# Patient Record
Sex: MALE | Race: WHITE | NOT HISPANIC OR LATINO | URBAN - METROPOLITAN AREA
[De-identification: names, ages, dates, MRNs, and addresses within clinical notes are randomized per-mention and may not be internally consistent; named-entity substitution may affect disease eponyms.]

---

## 2018-02-08 ENCOUNTER — EMERGENCY (EMERGENCY)
Facility: HOSPITAL | Age: 58
LOS: 1 days | Discharge: HOME | End: 2018-02-08
Attending: EMERGENCY MEDICINE | Admitting: EMERGENCY MEDICINE
Payer: COMMERCIAL

## 2018-02-08 VITALS
SYSTOLIC BLOOD PRESSURE: 138 MMHG | TEMPERATURE: 99 F | OXYGEN SATURATION: 96 % | RESPIRATION RATE: 16 BRPM | HEART RATE: 81 BPM | DIASTOLIC BLOOD PRESSURE: 77 MMHG

## 2018-02-08 LAB
ALBUMIN SERPL ELPH-MCNC: 3.8 G/DL — SIGNIFICANT CHANGE UP (ref 3.3–5)
ALP SERPL-CCNC: 78 U/L — SIGNIFICANT CHANGE UP (ref 40–120)
ALT FLD-CCNC: 35 U/L RC — SIGNIFICANT CHANGE UP (ref 10–45)
ANION GAP SERPL CALC-SCNC: 13 MMOL/L — SIGNIFICANT CHANGE UP (ref 5–17)
AST SERPL-CCNC: 35 U/L — SIGNIFICANT CHANGE UP (ref 10–40)
BASOPHILS # BLD AUTO: 0 K/UL — SIGNIFICANT CHANGE UP (ref 0–0.2)
BASOPHILS NFR BLD AUTO: 0.5 % — SIGNIFICANT CHANGE UP (ref 0–2)
BILIRUB SERPL-MCNC: 0.2 MG/DL — SIGNIFICANT CHANGE UP (ref 0.2–1.2)
BUN SERPL-MCNC: 20 MG/DL — SIGNIFICANT CHANGE UP (ref 7–23)
CALCIUM SERPL-MCNC: 9.2 MG/DL — SIGNIFICANT CHANGE UP (ref 8.4–10.5)
CHLORIDE SERPL-SCNC: 101 MMOL/L — SIGNIFICANT CHANGE UP (ref 96–108)
CO2 SERPL-SCNC: 28 MMOL/L — SIGNIFICANT CHANGE UP (ref 22–31)
CREAT SERPL-MCNC: 1.23 MG/DL — SIGNIFICANT CHANGE UP (ref 0.5–1.3)
EOSINOPHIL # BLD AUTO: 0 K/UL — SIGNIFICANT CHANGE UP (ref 0–0.5)
EOSINOPHIL NFR BLD AUTO: 0.5 % — SIGNIFICANT CHANGE UP (ref 0–6)
GLUCOSE SERPL-MCNC: 115 MG/DL — HIGH (ref 70–99)
HCT VFR BLD CALC: 45.9 % — SIGNIFICANT CHANGE UP (ref 39–50)
HGB BLD-MCNC: 15.6 G/DL — SIGNIFICANT CHANGE UP (ref 13–17)
LYMPHOCYTES # BLD AUTO: 1.6 K/UL — SIGNIFICANT CHANGE UP (ref 1–3.3)
LYMPHOCYTES # BLD AUTO: 28.5 % — SIGNIFICANT CHANGE UP (ref 13–44)
MCHC RBC-ENTMCNC: 31.6 PG — SIGNIFICANT CHANGE UP (ref 27–34)
MCHC RBC-ENTMCNC: 34.1 GM/DL — SIGNIFICANT CHANGE UP (ref 32–36)
MCV RBC AUTO: 92.9 FL — SIGNIFICANT CHANGE UP (ref 80–100)
MONOCYTES # BLD AUTO: 0.7 K/UL — SIGNIFICANT CHANGE UP (ref 0–0.9)
MONOCYTES NFR BLD AUTO: 13.3 % — SIGNIFICANT CHANGE UP (ref 2–14)
NEUTROPHILS # BLD AUTO: 3.2 K/UL — SIGNIFICANT CHANGE UP (ref 1.8–7.4)
NEUTROPHILS NFR BLD AUTO: 57.2 % — SIGNIFICANT CHANGE UP (ref 43–77)
PLATELET # BLD AUTO: 137 K/UL — LOW (ref 150–400)
POTASSIUM SERPL-MCNC: 4 MMOL/L — SIGNIFICANT CHANGE UP (ref 3.5–5.3)
POTASSIUM SERPL-SCNC: 4 MMOL/L — SIGNIFICANT CHANGE UP (ref 3.5–5.3)
PROT SERPL-MCNC: 7.4 G/DL — SIGNIFICANT CHANGE UP (ref 6–8.3)
RBC # BLD: 4.94 M/UL — SIGNIFICANT CHANGE UP (ref 4.2–5.8)
RBC # FLD: 12 % — SIGNIFICANT CHANGE UP (ref 10.3–14.5)
SODIUM SERPL-SCNC: 142 MMOL/L — SIGNIFICANT CHANGE UP (ref 135–145)
WBC # BLD: 5.6 K/UL — SIGNIFICANT CHANGE UP (ref 3.8–10.5)
WBC # FLD AUTO: 5.6 K/UL — SIGNIFICANT CHANGE UP (ref 3.8–10.5)

## 2018-02-08 PROCEDURE — 86140 C-REACTIVE PROTEIN: CPT

## 2018-02-08 PROCEDURE — 96374 THER/PROPH/DIAG INJ IV PUSH: CPT

## 2018-02-08 PROCEDURE — 93005 ELECTROCARDIOGRAM TRACING: CPT

## 2018-02-08 PROCEDURE — 99284 EMERGENCY DEPT VISIT MOD MDM: CPT | Mod: 25

## 2018-02-08 PROCEDURE — 80053 COMPREHEN METABOLIC PANEL: CPT

## 2018-02-08 PROCEDURE — 96375 TX/PRO/DX INJ NEW DRUG ADDON: CPT

## 2018-02-08 PROCEDURE — 93010 ELECTROCARDIOGRAM REPORT: CPT

## 2018-02-08 PROCEDURE — 85652 RBC SED RATE AUTOMATED: CPT

## 2018-02-08 PROCEDURE — 85027 COMPLETE CBC AUTOMATED: CPT

## 2018-02-08 RX ORDER — SODIUM CHLORIDE 9 MG/ML
1000 INJECTION, SOLUTION INTRAVENOUS ONCE
Qty: 0 | Refills: 0 | Status: COMPLETED | OUTPATIENT
Start: 2018-02-08 | End: 2018-02-08

## 2018-02-08 RX ORDER — ACETAMINOPHEN 500 MG
1000 TABLET ORAL ONCE
Qty: 0 | Refills: 0 | Status: COMPLETED | OUTPATIENT
Start: 2018-02-08 | End: 2018-02-08

## 2018-02-08 RX ORDER — KETOROLAC TROMETHAMINE 30 MG/ML
30 SYRINGE (ML) INJECTION ONCE
Qty: 0 | Refills: 0 | Status: DISCONTINUED | OUTPATIENT
Start: 2018-02-08 | End: 2018-02-08

## 2018-02-08 RX ADMIN — Medication 400 MILLIGRAM(S): at 23:09

## 2018-02-08 RX ADMIN — SODIUM CHLORIDE 2000 MILLILITER(S): 9 INJECTION, SOLUTION INTRAVENOUS at 23:10

## 2018-02-08 RX ADMIN — Medication 30 MILLIGRAM(S): at 23:10

## 2018-02-08 NOTE — ED PROVIDER NOTE - PROGRESS NOTE DETAILS
Akbar Caicedo MD, PGY2: Patient received at resident sign out. Pt feels completely better. Appears well. Patient informed of ED visit findings, understands plan.  Patient provided with written and further verbal instructions not included in discharge paperwork.  Patient instructed to follow up with their primary care physician in 2-3 days and return for new, worsened, or persistent symptoms.

## 2018-02-08 NOTE — ED PROVIDER NOTE - CARE PLAN
Principal Discharge DX:	Acute nonintractable headache, unspecified headache type  Assessment and plan of treatment:	Patient with unilateral throbbing, temporally concentrated headache with associated lacrimation   Ddx: Temporal arteritis vs. migraine vs. cluster headache vs. post viral   Check CBC, BMP, ESR/CRP  Toradol for pain control   O2 for possible cluster headache control   Will reassess after intervention

## 2018-02-08 NOTE — ED PROVIDER NOTE - ATTENDING CONTRIBUTION TO CARE
Patient presenting with gradual onset R sided headaches beginning yesterday and slowly intesifying over the course of the day.  No associated head trauma, visual changes, numbness, tingling and weakness.  No similar prior headaches in past.  Localizes headache to R temporal area.  Has been having preceding viral type illness.    On exam patient well appearing, vital signs within normal limits, RRR S1/S2, lungs clear to ascultation bilaterally, abdomen soft, non tender, non distended, CN II-XII intact, normal strength, sensation and coordination, no meningismus, able to touch toes and fully flex neck without any dyscomfort.  No point tenderness over temporal artery.    Low suspicion for temporal arteritis by exam, no evidence of bacterial meningitis on exam, headaches possibly sequela of ongoing viral infection - plan for screening labs/ESR/CRP, treatment of headaches and re-evaluation.

## 2018-02-08 NOTE — ED ADULT NURSE NOTE - OBJECTIVE STATEMENT
pt c/o "rt sided h/a since last night, +cold/fevers since tuesday -last dose of advil was 4 hrs ago. No visual disturbances, n/v"

## 2018-02-08 NOTE — ED ADULT NURSE NOTE - CHPI ED SYMPTOMS NEG
no confusion/no numbness/no change in level of consciousness/no blurred vision/no dizziness/no vomiting/no loss of consciousness/no weakness/no nausea

## 2018-02-08 NOTE — ED PROVIDER NOTE - PLAN OF CARE
Patient with unilateral throbbing, temporally concentrated headache with associated lacrimation   Ddx: Temporal arteritis vs. migraine vs. cluster headache vs. post viral   Check CBC, BMP, ESR/CRP  Toradol for pain control   O2 for possible cluster headache control   Will reassess after intervention

## 2018-02-08 NOTE — ED PROVIDER NOTE - OBJECTIVE STATEMENT
58 yo M with no significant PMHx presenting with one day of 8/10 headache, right sided, temporal, associated with lacrimation, in the setting of recent flu like symptoms that started several days ago, patient had been c/o myalgias, jt pain, fevers, with Tmax 102, and generalized malaise, he saw a nurse at his job who swabbed him for the flu, but with negative result.  His symptoms resolved, however he started to experience the headache yesterday.  It is unilateral, pulsating, concentrated in the temporal region, with radiation up his head and around his eye.  No vision changes, facial numbness, other focal deficits, no jaw claudication, no n/v/photophobia.  Patient denies any history of migraines or headaches. No family history of migraines or any autoimmune disorders, no sick contacts, no

## 2018-02-09 VITALS
DIASTOLIC BLOOD PRESSURE: 73 MMHG | HEART RATE: 69 BPM | OXYGEN SATURATION: 97 % | RESPIRATION RATE: 16 BRPM | SYSTOLIC BLOOD PRESSURE: 115 MMHG | TEMPERATURE: 98 F

## 2018-02-09 LAB
CRP SERPL-MCNC: 1 MG/DL — HIGH (ref 0–0.4)
ERYTHROCYTE [SEDIMENTATION RATE] IN BLOOD: 14 MM/HR — SIGNIFICANT CHANGE UP (ref 0–20)

## 2018-02-09 NOTE — ED POST DISCHARGE NOTE - REASON FOR FOLLOW-UP
Other Elevated CRP 1.00. Will call patient to see how patient is doing. If continues to have 1 sided headache or has any vision changes, jaw claudication, then needs to return to ED, start steroids, or urgently f/u for possible biopsy. -Jasper Pennington MD (resident)

## 2019-02-15 ENCOUNTER — INPATIENT (INPATIENT)
Facility: HOSPITAL | Age: 59
LOS: 0 days | Discharge: HOME | DRG: 176 | End: 2019-02-16
Attending: HOSPITALIST | Admitting: HOSPITALIST
Payer: COMMERCIAL

## 2019-02-15 VITALS
SYSTOLIC BLOOD PRESSURE: 121 MMHG | TEMPERATURE: 98 F | WEIGHT: 235.01 LBS | OXYGEN SATURATION: 99 % | RESPIRATION RATE: 17 BRPM | HEIGHT: 73 IN | DIASTOLIC BLOOD PRESSURE: 83 MMHG | HEART RATE: 81 BPM

## 2019-02-15 DIAGNOSIS — D69.6 THROMBOCYTOPENIA, UNSPECIFIED: ICD-10-CM

## 2019-02-15 DIAGNOSIS — I26.99 OTHER PULMONARY EMBOLISM WITHOUT ACUTE COR PULMONALE: ICD-10-CM

## 2019-02-15 LAB
ALBUMIN SERPL ELPH-MCNC: 4.4 G/DL — SIGNIFICANT CHANGE UP (ref 3.3–5)
ALP SERPL-CCNC: 109 U/L — SIGNIFICANT CHANGE UP (ref 40–120)
ALT FLD-CCNC: 20 U/L — SIGNIFICANT CHANGE UP (ref 10–45)
ANION GAP SERPL CALC-SCNC: 11 MMOL/L — SIGNIFICANT CHANGE UP (ref 5–17)
APTT BLD: 29 SEC — SIGNIFICANT CHANGE UP (ref 27.5–36.3)
AST SERPL-CCNC: 23 U/L — SIGNIFICANT CHANGE UP (ref 10–40)
BILIRUB SERPL-MCNC: 0.3 MG/DL — SIGNIFICANT CHANGE UP (ref 0.2–1.2)
BUN SERPL-MCNC: 19 MG/DL — SIGNIFICANT CHANGE UP (ref 7–23)
CALCIUM SERPL-MCNC: 9.4 MG/DL — SIGNIFICANT CHANGE UP (ref 8.4–10.5)
CHLORIDE SERPL-SCNC: 100 MMOL/L — SIGNIFICANT CHANGE UP (ref 96–108)
CO2 SERPL-SCNC: 27 MMOL/L — SIGNIFICANT CHANGE UP (ref 22–31)
CREAT SERPL-MCNC: 1.09 MG/DL — SIGNIFICANT CHANGE UP (ref 0.5–1.3)
D DIMER BLD IA.RAPID-MCNC: 5580 NG/ML DDU — HIGH
GLUCOSE SERPL-MCNC: 115 MG/DL — HIGH (ref 70–99)
HCT VFR BLD CALC: 43.8 % — SIGNIFICANT CHANGE UP (ref 39–50)
HGB BLD-MCNC: 15 G/DL — SIGNIFICANT CHANGE UP (ref 13–17)
INR BLD: 1.04 RATIO — SIGNIFICANT CHANGE UP (ref 0.88–1.16)
LIDOCAIN IGE QN: 24 U/L — SIGNIFICANT CHANGE UP (ref 7–60)
MCHC RBC-ENTMCNC: 30.8 PG — SIGNIFICANT CHANGE UP (ref 27–34)
MCHC RBC-ENTMCNC: 34.2 GM/DL — SIGNIFICANT CHANGE UP (ref 32–36)
MCV RBC AUTO: 90.1 FL — SIGNIFICANT CHANGE UP (ref 80–100)
NT-PROBNP SERPL-SCNC: 22 PG/ML — SIGNIFICANT CHANGE UP (ref 0–300)
PLATELET # BLD AUTO: 128 K/UL — LOW (ref 150–400)
POTASSIUM SERPL-MCNC: 4.3 MMOL/L — SIGNIFICANT CHANGE UP (ref 3.5–5.3)
POTASSIUM SERPL-SCNC: 4.3 MMOL/L — SIGNIFICANT CHANGE UP (ref 3.5–5.3)
PROT SERPL-MCNC: 7.7 G/DL — SIGNIFICANT CHANGE UP (ref 6–8.3)
PROTHROM AB SERPL-ACNC: 11.9 SEC — SIGNIFICANT CHANGE UP (ref 10–12.9)
RBC # BLD: 4.86 M/UL — SIGNIFICANT CHANGE UP (ref 4.2–5.8)
RBC # FLD: 11.9 % — SIGNIFICANT CHANGE UP (ref 10.3–14.5)
SODIUM SERPL-SCNC: 138 MMOL/L — SIGNIFICANT CHANGE UP (ref 135–145)
TROPONIN T, HIGH SENSITIVITY RESULT: 6 NG/L — SIGNIFICANT CHANGE UP (ref 0–51)
WBC # BLD: 10.1 K/UL — SIGNIFICANT CHANGE UP (ref 3.8–10.5)
WBC # FLD AUTO: 10.1 K/UL — SIGNIFICANT CHANGE UP (ref 3.8–10.5)

## 2019-02-15 PROCEDURE — 93971 EXTREMITY STUDY: CPT | Mod: 26

## 2019-02-15 PROCEDURE — 71046 X-RAY EXAM CHEST 2 VIEWS: CPT | Mod: 26

## 2019-02-15 PROCEDURE — 71275 CT ANGIOGRAPHY CHEST: CPT | Mod: 26

## 2019-02-15 PROCEDURE — 99285 EMERGENCY DEPT VISIT HI MDM: CPT | Mod: 25

## 2019-02-15 PROCEDURE — 93010 ELECTROCARDIOGRAM REPORT: CPT

## 2019-02-15 PROCEDURE — 99223 1ST HOSP IP/OBS HIGH 75: CPT

## 2019-02-15 RX ORDER — ACETAMINOPHEN 500 MG
650 TABLET ORAL EVERY 6 HOURS
Qty: 0 | Refills: 0 | Status: DISCONTINUED | OUTPATIENT
Start: 2019-02-15 | End: 2019-02-16

## 2019-02-15 RX ORDER — LIDOCAINE 4 G/100G
1 CREAM TOPICAL ONCE
Qty: 0 | Refills: 0 | Status: COMPLETED | OUTPATIENT
Start: 2019-02-15 | End: 2019-02-15

## 2019-02-15 RX ORDER — APIXABAN 2.5 MG/1
10 TABLET, FILM COATED ORAL EVERY 12 HOURS
Qty: 0 | Refills: 0 | Status: DISCONTINUED | OUTPATIENT
Start: 2019-02-15 | End: 2019-02-16

## 2019-02-15 RX ORDER — ENOXAPARIN SODIUM 100 MG/ML
110 INJECTION SUBCUTANEOUS ONCE
Qty: 0 | Refills: 0 | Status: COMPLETED | OUTPATIENT
Start: 2019-02-15 | End: 2019-02-15

## 2019-02-15 RX ORDER — SODIUM CHLORIDE 9 MG/ML
1000 INJECTION, SOLUTION INTRAVENOUS ONCE
Qty: 0 | Refills: 0 | Status: COMPLETED | OUTPATIENT
Start: 2019-02-15 | End: 2019-02-15

## 2019-02-15 RX ORDER — APIXABAN 2.5 MG/1
2 TABLET, FILM COATED ORAL
Qty: 28 | Refills: 0 | OUTPATIENT
Start: 2019-02-15 | End: 2019-02-21

## 2019-02-15 RX ADMIN — SODIUM CHLORIDE 1000 MILLILITER(S): 9 INJECTION, SOLUTION INTRAVENOUS at 12:36

## 2019-02-15 RX ADMIN — APIXABAN 10 MILLIGRAM(S): 2.5 TABLET, FILM COATED ORAL at 19:02

## 2019-02-15 RX ADMIN — SODIUM CHLORIDE 1000 MILLILITER(S): 9 INJECTION, SOLUTION INTRAVENOUS at 11:36

## 2019-02-15 RX ADMIN — ENOXAPARIN SODIUM 110 MILLIGRAM(S): 100 INJECTION SUBCUTANEOUS at 13:41

## 2019-02-15 RX ADMIN — LIDOCAINE 1 PATCH: 4 CREAM TOPICAL at 19:02

## 2019-02-15 NOTE — ED ADULT NURSE REASSESSMENT NOTE - NS ED NURSE REASSESS COMMENT FT1
Pt resting comfortably in stretcher. Pt on cardiac monitor, NSR HR 74. Pt provided sandwich per MD Asencio. Pt awaiting admission to hospital at this time. Pt aware of plan of care.

## 2019-02-15 NOTE — ED PROVIDER NOTE - NS ED ROS FT
Constitutional: no fever, no chills.  Eyes: no visual changes.  ENMT: no sore throat.  CV: +chest pain.  Resp: no cough, +shortness of breath.  GI: no abdominal pain, no nausea, no vomiting, no diarrhea.  : no dysuria, no hematuria.  MSK: no back pain, no neck pain.  Skin: no rashes.  Neuro: no headache, no loss of consciousness, no weakness, no numbness, no tingling.

## 2019-02-15 NOTE — H&P ADULT - NEUROLOGICAL DETAILS
responds to pain/responds to verbal commands/normal strength/sensation intact/alert and oriented x 3/cranial nerves intact

## 2019-02-15 NOTE — H&P ADULT - HISTORY OF PRESENT ILLNESS
Mr. Ngo is a 58 year old man with no significant PMHx who presented to the ED this morning with complaint of acute onset right sided sharp chest pain and shoulder pain. The patient was in his usual state of health until this morning when he had acute onset of the above pain upon waking up. The pain radiates to his right shoulder. He had never had pain like this before. He denies shortness of breath, but taking a deep breath can make the pain worse. He has had no lower extremity pain. There is no personal history of cardiac disease or blood clots. There is no family history of hypercoaguable state. He has not travelled recently and remains quite active, playing Pickleball frequently.

## 2019-02-15 NOTE — ED ADULT NURSE NOTE - CHIEF COMPLAINT QUOTE
pain when taking deep breaths, saw his MD and he suggested him come to ED for CT scan for r/o PE. denies pain feels a soreness

## 2019-02-15 NOTE — ED PROVIDER NOTE - ATTENDING CONTRIBUTION TO CARE
Attending MD Danae Asencio:  I personally have seen and examined this patient.  Resident note reviewed and agree on plan of care and except where noted.  See HPI, PE, and MDM for details.

## 2019-02-15 NOTE — H&P ADULT - GASTROINTESTINAL DETAILS
no rebound tenderness/soft/nontender/no guarding/bowel sounds normal/no masses palpable/no distention

## 2019-02-15 NOTE — H&P ADULT - NSHPPHYSICALEXAM_GEN_ALL_CORE
Vital Signs Last 24 Hrs  T(C): 36.8 (15 Feb 2019 10:28), Max: 36.8 (15 Feb 2019 10:28)  T(F): 98.3 (15 Feb 2019 10:28), Max: 98.3 (15 Feb 2019 10:28)  HR: 74 (15 Feb 2019 14:04) (72 - 81)  BP: 118/77 (15 Feb 2019 14:04) (118/77 - 121/83)  BP(mean): --  RR: 18 (15 Feb 2019 14:04) (17 - 18)  SpO2: 95% (15 Feb 2019 14:04) (95% - 99%)

## 2019-02-15 NOTE — ED PROVIDER NOTE - PHYSICAL EXAMINATION
Attending Danae Asencio: Gen: NAD, heent: atrauamtic, eomi, perrla, mmm, op pink, uvula midline, neck; nttp, no nuchal rigidity, chest: nttp, no crepitus, cv: rrr, no murmurs, lungs: ctab, abd: soft, nontender, nondistended, no peritoneal signs, +BS, no guarding, ext: wwp,RLE swelling, skin: no rash, neuro: awake and alert, following commands, speech clear, sensation and strength intact, no focal deficits

## 2019-02-15 NOTE — ED PROVIDER NOTE - PROGRESS NOTE DETAILS
Attending Danae Asencio: pocus shows evidence of right lower ext dvt. cta ordered to further evaluate. awaiting blood work and then will anticoagulate. per family member has been complaining of muscle cramps to leg

## 2019-02-15 NOTE — H&P ADULT - NSHPLABSRESULTS_GEN_ALL_CORE
CTA Chest personally reviewed: Multiple R sided pulmonary emboli with small area of infarct.  CXR personally reviewed: Clear lungs  EKG personally reviewed: NSR  Labs reviewed as below.

## 2019-02-15 NOTE — ED ADULT NURSE NOTE - NSIMPLEMENTINTERV_GEN_ALL_ED
Implemented All Universal Safety Interventions:  [place] to call system. Call bell, personal items and telephone within reach. Instruct patient to call for assistance. Room bathroom lighting operational. Non-slip footwear when patient is off stretcher. Physically safe environment: no spills, clutter or unnecessary equipment. Stretcher in lowest position, wheels locked, appropriate side rails in place.

## 2019-02-15 NOTE — ED ADULT NURSE NOTE - OBJECTIVE STATEMENT
59 y/o male denies PMH presents to ED reporting SOB this morning for approximately one hour & R sided pain starting this morning. Pt went to PCP where EKG was completed & PCP sent pt to ER for CTA study. On exam, AOx3 speaking in complete sentences. Lung sounds CTA, NAD. Abdomen soft, non-tender, non-distended, normoactive bowel sounds in all 4 quadrants. Pt denies CP, leg pain, palpitations or SOB at this time. No edema noted to legs. Heplock placed, labs sent. Pt placed on cardiac monitor, HR 72 NSR. Awaiting CXR and CTA at this time.

## 2019-02-15 NOTE — ED ADULT TRIAGE NOTE - CHIEF COMPLAINT QUOTE
pain when taking deep breaths, saw his MD and he suggested him come to ED for CT scan. denies pain feels a soreness

## 2019-02-15 NOTE — ED PROVIDER NOTE - OBJECTIVE STATEMENT
Attending Danae Asencio: 59 y/o previously healthy male presentign with right sided chest discomfort. pt states symptoms started this morning. noticed pain to the right side of his chest that worsened with inspiration. no history of similar. took a shower with some improvement. no recent falls or trauma. did not take anything for pain. no recent illnesses. no LE swelling.

## 2019-02-15 NOTE — H&P ADULT - RS GEN PE MLT RESP DETAILS PC
good air movement/respirations non-labored/no rales/no wheezes/airway patent/clear to auscultation bilaterally/no rhonchi/breath sounds equal

## 2019-02-15 NOTE — H&P ADULT - PROBLEM SELECTOR PLAN 2
Mild on testing today, no reported history of such.  1. Repeat CBC in AM.  2. If persistent, check peripheral smear.    Discharge planning to home tomorrow pending LE doppler and clinical stability.  Discussed with patient and wife at bedside.

## 2019-02-15 NOTE — ED PROVIDER NOTE - CLINICAL SUMMARY MEDICAL DECISION MAKING FREE TEXT BOX
Attending Danae Asencio: 57 y/o male presenting with pleuritic sob. upon arrival pt hemodynamically stable. on exam pt with RLE swelling. pocus shows evidence of DVT. no recent head trauma or black or bloody stools. with pleuritic pain concern for possible PE. will CTA chest, check cardiac enzymes and d/w pt's pcp

## 2019-02-15 NOTE — ED ADULT NURSE REASSESSMENT NOTE - NS ED NURSE REASSESS COMMENT FT1
Pt resting comfortably in stretcher, sleeping, cardiac monitor in place, NSR, HR 84. Awaiting bed placement at this time. Pt aware of plan of care. Wife at bedside.

## 2019-02-15 NOTE — H&P ADULT - PROBLEM SELECTOR PLAN 1
1. Plan to initiate eliquis 10 mg PO BID x 7 days starting tonight. Rx already sent to primary pharmacy - cost is approximately $60, which patient is agreeable to paying.  2. Thereafter, Rx to be written by PMD, Dr. Rubio, to be dosed 5 mg PO BID. Since this is an unprovoked DVT, would plan for 3-6 months of therapy.  3. Obtain formal LE doppler - may be useful in the future to help tailor duration of therapy based on repeat studies.  4. No signs of R heart strain - defer TTE testing.  5. Pain control for pleurisy - tylenol PRN, lidoderm patch.  6. Ambulation as tolerated.

## 2019-02-15 NOTE — H&P ADULT - NEGATIVE GASTROINTESTINAL SYMPTOMS
no diarrhea/no change in bowel habits/no abdominal pain/no melena/no hematochezia/no vomiting/no nausea/no constipation

## 2019-02-15 NOTE — ED ADULT NURSE REASSESSMENT NOTE - NS ED NURSE REASSESS COMMENT FT1
Admitting MD at bedside for evaluation. Pt admitted to telemetry for diagnosis of pulmonary embolism. Awaiting bed placement at this time, pt aware of plan of care.

## 2019-02-15 NOTE — H&P ADULT - ASSESSMENT
Mr. Ngo is a 58 year old man with no significant PMHx who presented to the ED this morning with complaint of acute onset right sided sharp chest pain and shoulder pain, found to have R sided pulmonary emboli with infarct and reportedly LLE DVT per ED ultrasound. Labs are without elevated BNP or troponin to suggest heart strain. O2 sat is WNL on RA. PESI score is in low risk range and is a candidate for outpatient therapy.

## 2019-02-16 ENCOUNTER — TRANSCRIPTION ENCOUNTER (OUTPATIENT)
Age: 59
End: 2019-02-16

## 2019-02-16 VITALS — WEIGHT: 237.66 LBS

## 2019-02-16 LAB
ANION GAP SERPL CALC-SCNC: 13 MMOL/L — SIGNIFICANT CHANGE UP (ref 5–17)
BUN SERPL-MCNC: 15 MG/DL — SIGNIFICANT CHANGE UP (ref 7–23)
CALCIUM SERPL-MCNC: 8.5 MG/DL — SIGNIFICANT CHANGE UP (ref 8.4–10.5)
CHLORIDE SERPL-SCNC: 100 MMOL/L — SIGNIFICANT CHANGE UP (ref 96–108)
CO2 SERPL-SCNC: 23 MMOL/L — SIGNIFICANT CHANGE UP (ref 22–31)
CREAT SERPL-MCNC: 1.09 MG/DL — SIGNIFICANT CHANGE UP (ref 0.5–1.3)
GLUCOSE SERPL-MCNC: 117 MG/DL — HIGH (ref 70–99)
HCT VFR BLD CALC: 41.5 % — SIGNIFICANT CHANGE UP (ref 39–50)
HGB BLD-MCNC: 13.5 G/DL — SIGNIFICANT CHANGE UP (ref 13–17)
MCHC RBC-ENTMCNC: 30 PG — SIGNIFICANT CHANGE UP (ref 27–34)
MCHC RBC-ENTMCNC: 32.5 GM/DL — SIGNIFICANT CHANGE UP (ref 32–36)
MCV RBC AUTO: 92.2 FL — SIGNIFICANT CHANGE UP (ref 80–100)
PLATELET # BLD AUTO: 138 K/UL — LOW (ref 150–400)
POTASSIUM SERPL-MCNC: 4 MMOL/L — SIGNIFICANT CHANGE UP (ref 3.5–5.3)
POTASSIUM SERPL-SCNC: 4 MMOL/L — SIGNIFICANT CHANGE UP (ref 3.5–5.3)
RBC # BLD: 4.5 M/UL — SIGNIFICANT CHANGE UP (ref 4.2–5.8)
RBC # FLD: 13.4 % — SIGNIFICANT CHANGE UP (ref 10.3–14.5)
SODIUM SERPL-SCNC: 136 MMOL/L — SIGNIFICANT CHANGE UP (ref 135–145)
WBC # BLD: 10.06 K/UL — SIGNIFICANT CHANGE UP (ref 3.8–10.5)
WBC # FLD AUTO: 10.06 K/UL — SIGNIFICANT CHANGE UP (ref 3.8–10.5)

## 2019-02-16 PROCEDURE — 93971 EXTREMITY STUDY: CPT

## 2019-02-16 PROCEDURE — 93970 EXTREMITY STUDY: CPT | Mod: 26

## 2019-02-16 PROCEDURE — 85027 COMPLETE CBC AUTOMATED: CPT

## 2019-02-16 PROCEDURE — 96360 HYDRATION IV INFUSION INIT: CPT

## 2019-02-16 PROCEDURE — 96372 THER/PROPH/DIAG INJ SC/IM: CPT

## 2019-02-16 PROCEDURE — 80053 COMPREHEN METABOLIC PANEL: CPT

## 2019-02-16 PROCEDURE — 85379 FIBRIN DEGRADATION QUANT: CPT

## 2019-02-16 PROCEDURE — 93005 ELECTROCARDIOGRAM TRACING: CPT

## 2019-02-16 PROCEDURE — 85730 THROMBOPLASTIN TIME PARTIAL: CPT

## 2019-02-16 PROCEDURE — 83880 ASSAY OF NATRIURETIC PEPTIDE: CPT

## 2019-02-16 PROCEDURE — 93970 EXTREMITY STUDY: CPT

## 2019-02-16 PROCEDURE — 84484 ASSAY OF TROPONIN QUANT: CPT

## 2019-02-16 PROCEDURE — 99285 EMERGENCY DEPT VISIT HI MDM: CPT | Mod: 25

## 2019-02-16 PROCEDURE — 71046 X-RAY EXAM CHEST 2 VIEWS: CPT

## 2019-02-16 PROCEDURE — 71275 CT ANGIOGRAPHY CHEST: CPT

## 2019-02-16 PROCEDURE — 80048 BASIC METABOLIC PNL TOTAL CA: CPT

## 2019-02-16 PROCEDURE — 83690 ASSAY OF LIPASE: CPT

## 2019-02-16 PROCEDURE — 99239 HOSP IP/OBS DSCHRG MGMT >30: CPT

## 2019-02-16 PROCEDURE — 85610 PROTHROMBIN TIME: CPT

## 2019-02-16 RX ORDER — APIXABAN 2.5 MG/1
2 TABLET, FILM COATED ORAL
Qty: 28 | Refills: 0 | OUTPATIENT
Start: 2019-02-16 | End: 2019-02-22

## 2019-02-16 RX ORDER — APIXABAN 2.5 MG/1
1 TABLET, FILM COATED ORAL
Qty: 60 | Refills: 0
Start: 2019-02-16 | End: 2019-03-17

## 2019-02-16 RX ORDER — APIXABAN 2.5 MG/1
2 TABLET, FILM COATED ORAL
Qty: 28 | Refills: 0
Start: 2019-02-16 | End: 2019-02-22

## 2019-02-16 RX ADMIN — Medication 650 MILLIGRAM(S): at 05:41

## 2019-02-16 RX ADMIN — LIDOCAINE 1 PATCH: 4 CREAM TOPICAL at 07:35

## 2019-02-16 RX ADMIN — APIXABAN 10 MILLIGRAM(S): 2.5 TABLET, FILM COATED ORAL at 05:38

## 2019-02-16 RX ADMIN — APIXABAN 10 MILLIGRAM(S): 2.5 TABLET, FILM COATED ORAL at 17:47

## 2019-02-16 NOTE — CHART NOTE - NSCHARTNOTEFT_GEN_A_CORE
Medicine NP([id number])    per [facility]. Tech prelim. read BLE U/S  negative DVT LLE  extensive RLE  DVT of mid-distal femoral vein,popliteal,tibial, gastrocnemius, peroneal & soleal veins  d/w Dr. Smith  -followup PMD within one week  -AC likely needed at least 6 months  -may d/c home today on Eliquis 10mg BID x 7 days   then Eliquis  5mg BID (Rx's sent to pt. pharmacy)

## 2019-02-16 NOTE — PROGRESS NOTE ADULT - PROBLEM SELECTOR PLAN 1
-c/w eliquis 10 mg PO BID x 7 days starting tonight. Rx already sent to primary pharmacy - cost is approximately $60, which patient is agreeable to paying. Thereafter, Rx to be written by PMD, Dr. Rubio, to be dosed 5 mg PO BID. Since this is an unprovoked DVT, would plan for 3-6 months of therapy.  - Awaiting obtain formal LE doppler - may be useful in the future to help tailor duration of therapy based on repeat studies.  - Pain control for pleurisy - tylenol PRN, lidoderm patch.

## 2019-02-16 NOTE — DISCHARGE NOTE ADULT - HOSPITAL COURSE
This is a 58 year old man with no significant PMHx who presented to the ED this morning with complaint of acute onset right sided sharp chest pain and shoulder pain found to have R sided pulmonary emboli with infarct and reportedly LLE DVT per ED ultrasound. Labs are without elevated BNP or troponin to suggest heart strain. O2 sat is WNL on RA. Pt was initially given Lovenox and transitioned to Eliquis 10 mg PO BID x 7 days. pt underwent official doppler of B/L lower extremities which revealed This is a 58 year old man with no significant PMHx who presented to the ED this morning with complaint of acute onset right sided sharp chest pain and shoulder pain found to have R sided pulmonary emboli with infarct and reportedly LLE DVT per ED ultrasound. Labs are without elevated BNP or troponin to suggest heart strain. O2 sat is WNL on RA. Pt was initially given Lovenox and transitioned to Eliquis 10 mg PO BID x 7 days; then 5mg BID.. pt underwent official doppler of B/L lower extremities which revealed extensive RLE DVT, LLE negative.  Cleared by medicine 2/16 for d/c home. F/U PMD within one week

## 2019-02-16 NOTE — PROGRESS NOTE ADULT - PROBLEM SELECTOR PLAN 2
Mild on testing today, no reported history of such. Plts slightly improved this am    Discharge planning to home today pending LE doppler.  Discharge time spent 33 minutes

## 2019-02-16 NOTE — DISCHARGE NOTE ADULT - PLAN OF CARE
Continue taking blood thinners, dissolve clot You were found to have Pulmonary emboli in the right middle and lower and left upper and lower   lobes with associated small focus of infarction in the right lower lobe on CT scan of your chest. This is the likely cause of your presenting symptoms. You were started on a blood thinning medication called Eliquis. This is to be taken twice daily. The starting dose is 10mg twice daily for the first 7 days. The dose id then decreased to 5mg twice a day thereafter. Typical course of treatement is 3-6 months. Please be sure to follow up with your primary care physician for continued care. Side effects of this medication include bleeding. Should you notice blood in your stool, dark tarry stools, or blood in your urine please come to the ED for evaluation. Additionally, should you fall which on this medication, please come to the ED for evaluation. You were found to have Pulmonary emboli in the right middle and lower and left upper and lower   lobes with associated small focus of infarction in the right lower lobe on CT scan of your chest. This is the likely cause of your presenting symptoms. You were started on a blood thinning medication called Eliquis. This is to be taken twice daily. The starting dose is 10mg twice daily for the first 7 days. The dose id then decreased to 5mg twice a day thereafter.  Please be sure to follow up with your primary care physician for continued care. Side effects of this medication include bleeding. Should you notice blood in your stool, dark tarry stools, or blood in your urine please come to the ED for evaluation. Additionally, should you fall which on this medication, please come to the ED for evaluation. extensive right lower extremity followup with PMD with PMD within one week for re-evaluation  take Eliquis 10mg oral every 12 hrs(began 2/15)x 7 days   then Eliquis 5mg oral every 12 hrs(2/23)  Rx's sent to pt. pharmacy 2/15 & 2/16 mild; improved F/U PMD next week for repeat CBC

## 2019-02-16 NOTE — DISCHARGE NOTE ADULT - PATIENT PORTAL LINK FT
You can access the [facility] Patient Portal, offered by [facility], by registering with the following website: [email or web address]

## 2019-02-16 NOTE — DISCHARGE NOTE ADULT - CARE PLAN
Principal Discharge DX:	Pulmonary embolism with infarction  Goal:	Continue taking blood thinners, dissolve clot  Assessment and plan of treatment:	You were found to have Pulmonary emboli in the right middle and lower and left upper and lower   lobes with associated small focus of infarction in the right lower lobe on CT scan of your chest. This is the likely cause of your presenting symptoms. You were started on a blood thinning medication called Eliquis. This is to be taken twice daily. The starting dose is 10mg twice daily for the first 7 days. The dose id then decreased to 5mg twice a day thereafter. Typical course of treatement is 3-6 months. Please be sure to follow up with your primary care physician for continued care. Side effects of this medication include bleeding. Should you notice blood in your stool, dark tarry stools, or blood in your urine please come to the ED for evaluation. Additionally, should you fall which on this medication, please come to the ED for evaluation. Principal Discharge DX:	Pulmonary embolism with infarction  Goal:	Continue taking blood thinners, dissolve clot  Assessment and plan of treatment:	You were found to have Pulmonary emboli in the right middle and lower and left upper and lower   lobes with associated small focus of infarction in the right lower lobe on CT scan of your chest. This is the likely cause of your presenting symptoms. You were started on a blood thinning medication called Eliquis. This is to be taken twice daily. The starting dose is 10mg twice daily for the first 7 days. The dose id then decreased to 5mg twice a day thereafter.  Please be sure to follow up with your primary care physician for continued care. Side effects of this medication include bleeding. Should you notice blood in your stool, dark tarry stools, or blood in your urine please come to the ED for evaluation. Additionally, should you fall which on this medication, please come to the ED for evaluation.  Secondary Diagnosis:	DVT (deep venous thrombosis)  Goal:	extensive right lower extremity  Assessment and plan of treatment:	followup with PMD with PMD within one week for re-evaluation  take Eliquis 10mg oral every 12 hrs(began 2/15)x 7 days   then Eliquis 5mg oral every 12 hrs(2/23)  Rx's sent to pt. pharmacy 2/15 & 2/16  Secondary Diagnosis:	Thrombocytopenia  Goal:	mild; improved  Assessment and plan of treatment:	F/U PMD next week for repeat CBC

## 2019-02-16 NOTE — DISCHARGE NOTE ADULT - CARE PROVIDER_API CALL
Chivo Rubio)  Cardiovascular Disease; Internal Medicine  [facility] [place] [id number]  Phone: [phone]  Fax: [phone]  Follow Up Time:

## 2019-02-16 NOTE — PROGRESS NOTE ADULT - SUBJECTIVE AND OBJECTIVE BOX
Go Smith MD  Division of Hospital Medicine  Pager [phone]      JOSE MOLINA  58y  Male      Patient is a 58y old  Male who presents with a chief complaint of Chest/Back Pain (15 Feb 2019 15:49)      INTERVAL HPI/OVERNIGHT EVENTS:  Seen at bedside. Offers no complaints      REVIEW OF SYSTEMS: 14 point ROS negative unless listed above    T(C): 36.8 (02-16-19 @ 04:47), Max: 37.5 (02-15-19 @ 20:15)  HR: 77 (02-16-19 @ 04:47) (74 - 83)  BP: 122/74 (02-16-19 @ 04:47) (102/66 - 131/82)  RR: 18 (02-16-19 @ 04:47) (18 - 18)  SpO2: 93% (02-16-19 @ 04:47) (93% - 97%)  Wt(kg): --Vital Signs Last 24 Hrs  T(C): 36.8 (16 Feb 2019 04:47), Max: 37.5 (15 Feb 2019 20:15)  T(F): 98.2 (16 Feb 2019 04:47), Max: 99.5 (15 Feb 2019 20:15)  HR: 77 (16 Feb 2019 04:47) (74 - 83)  BP: 122/74 (16 Feb 2019 04:47) (102/66 - 131/82)  BP(mean): --  RR: 18 (16 Feb 2019 04:47) (18 - 18)  SpO2: 93% (16 Feb 2019 04:47) (93% - 97%)    PHYSICAL EXAM:  GENERAL: NAD, well-groomed, well-developed  HEAD:  Atraumatic, Normocephalic  CHEST/LUNG: Clear to auscultation bilaterally; No rales, rhonchi, wheezing, or rubs  HEART: Regular rate and rhythm; No murmurs, rubs, or gallops  ABDOMEN: Soft, Nontender, Nondistended; Bowel sounds present.    EXTREMITIES:  2+ Peripheral Pulses, No clubbing, cyanosis, or edema  SKIN: No rashes or lesions  PSYCH: Alert & Oriented x3  NERVOUS SYSTEM: Motor Strength 5/5 B/L upper and lower extremities.  Sensory intact    Consultant(s) Notes Reviewed:  [x ] YES  [ ] NO  Care Discussed with Consultants/Other Providers [ x] YES  [ ] NO    LABS:                        13.5   10.06 )-----------( 138      ( 16 Feb 2019 07:42 )             41.5     02-16    136  |  100  |  15  ----------------------------<  117<H>  4.0   |  23  |  1.09    Ca    8.5      16 Feb 2019 06:30    TPro  7.7  /  Alb  4.4  /  TBili  0.3  /  DBili  x   /  AST  23  /  ALT  20  /  AlkPhos  109  02-15    PT/INR - ( 15 Feb 2019 11:11 )   PT: 11.9 sec;   INR: 1.04 ratio         PTT - ( 15 Feb 2019 11:11 )  PTT:29.0 sec    CAPILLARY BLOOD GLUCOSE                RADIOLOGY & ADDITIONAL TESTS:    Imaging Personally Reviewed:  [x ] YES  [ ] NO

## 2019-02-16 NOTE — DISCHARGE NOTE ADULT - MEDICATION SUMMARY - MEDICATIONS TO TAKE
I will START or STAY ON the medications listed below when I get home from the hospital:    Eliquis 5 mg oral tablet  -- 1 tab(s) by mouth every 12 hours   begin 2/23/19   -- Check with your doctor before becoming pregnant.  It is very important that you take or use this exactly as directed.  Do not skip doses or discontinue unless directed by your doctor.  Obtain medical advice before taking any non-prescription drugs as some may affect the action of this medication.    -- Indication: For Other pulmonary embolism without acute cor pulmonale    Eliquis 5 mg oral tablet  -- 2 tab(s) by mouth 2 times a day x 7 days   thru 2/22/19     -- Check with your doctor before becoming pregnant.  It is very important that you take or use this exactly as directed.  Do not skip doses or discontinue unless directed by your doctor.  Obtain medical advice before taking any non-prescription drugs as some may affect the action of this medication.    -- Indication: For Other pulmonary embolism without acute cor pulmonale

## 2019-11-29 ENCOUNTER — EMERGENCY (EMERGENCY)
Facility: HOSPITAL | Age: 59
LOS: 1 days | Discharge: HOME | End: 2019-11-29
Attending: EMERGENCY MEDICINE
Payer: COMMERCIAL

## 2019-11-29 VITALS
HEART RATE: 83 BPM | DIASTOLIC BLOOD PRESSURE: 82 MMHG | RESPIRATION RATE: 18 BRPM | OXYGEN SATURATION: 97 % | SYSTOLIC BLOOD PRESSURE: 138 MMHG | TEMPERATURE: 98 F

## 2019-11-29 VITALS
WEIGHT: 240.08 LBS | OXYGEN SATURATION: 100 % | SYSTOLIC BLOOD PRESSURE: 134 MMHG | HEIGHT: 73 IN | RESPIRATION RATE: 20 BRPM | HEART RATE: 86 BPM | TEMPERATURE: 98 F | DIASTOLIC BLOOD PRESSURE: 84 MMHG

## 2019-11-29 PROCEDURE — 73562 X-RAY EXAM OF KNEE 3: CPT | Mod: 26,LT

## 2019-11-29 PROCEDURE — 73562 X-RAY EXAM OF KNEE 3: CPT

## 2019-11-29 PROCEDURE — 99283 EMERGENCY DEPT VISIT LOW MDM: CPT

## 2019-11-29 RX ORDER — ACETAMINOPHEN 500 MG
650 TABLET ORAL ONCE
Refills: 0 | Status: COMPLETED | OUTPATIENT
Start: 2019-11-29 | End: 2019-11-29

## 2019-11-29 RX ADMIN — Medication 650 MILLIGRAM(S): at 18:41

## 2019-11-29 NOTE — ED ADULT NURSE NOTE - NSIMPLEMENTINTERV_GEN_ALL_ED
Implemented All Fall Risk Interventions:  [place] to call system. Call bell, personal items and telephone within reach. Instruct patient to call for assistance. Room bathroom lighting operational. Non-slip footwear when patient is off stretcher. Physically safe environment: no spills, clutter or unnecessary equipment. Stretcher in lowest position, wheels locked, appropriate side rails in place. Provide visual cue, wrist band, yellow gown, etc. Monitor gait and stability. Monitor for mental status changes and reorient to person, place, and time. Review medications for side effects contributing to fall risk. Reinforce activity limits and safety measures with patient and family.

## 2019-11-29 NOTE — ED PROVIDER NOTE - RAPID ASSESSMENT
59 year old M with pmhx of PE and DVT of R knee (on Eliquis) presents to ED c/o L knee pain x3 days. States he was playing tennis ball and "pulled a muscle" in L knee as he turned his L knee outwards, hearing a click. No fall or trauma. Feels pain to superior L knee. Pain worse with flexion, able to extend leg. Knee has become swollen.     **Pt seen in waiting room by Chase Gleason(PA) Documentation completed by Andressa Ro. Pt to be sent to main ED for further evaluation - all orders placed to be followed by MD in the main ED** 59 year old M with pmhx of PE and DVT of R knee (on Eliquis) presents to ED c/o L knee pain x3 days. States he was playing tennis ball and "pulled a muscle" in L knee as he turned his L knee outwards, hearing a click. No fall or trauma. Feels pain to superior L knee. Pain worse with flexion, able to extend leg. Knee has become swollen. no fever/chills, no numbness/tingling.    **Pt seen in waiting room by Chase Gleason(PA) Documentation completed by Andressa Ro. Pt to be sent to main ED for further evaluation - all orders placed to be followed by MD in the main ED**

## 2019-11-29 NOTE — ED PROVIDER NOTE - NSFOLLOWUPINSTRUCTIONS_ED_ALL_ED_FT
Take ibuprofen (motrin/advil) 400-600mg every 6 hrs as needed for pain. Take with food    Take Tylenol 650mg to 1000mg every 6 hrs as needed for pain. Do not exceed 4grams per day.     Rest. Apply cold pack for 20 minutes multiple times daily. Elevate.

## 2019-11-29 NOTE — ED ADULT NURSE NOTE - CAS EDN DISCHARGE ASSESSMENT
Pt cleared for discharge by Mine MATHEWS. Pt discharged with crutches, Pt given instructions on using crutches./Alert and oriented to person, place and time

## 2019-11-29 NOTE — ED PROVIDER NOTE - NS ED ROS FT
CONSTITUTIONAL: No fevers, no chills, no lightheadedness, no dizziness  Eyes: no visual changes  Ears: no ear drainage, no ear pain  Nose: no nasal congestion  Mouth/Throat: no sore throat  CV: No chest pain, no palpitations  PULM: No SOB, no cough  GI: No n/v/d, no abd pain  : no dysuria, no hematuria  SKIN: no rashes.  NEURO: no headache, no focal weakness or numbness  LYMPH/VASC: no LE swelling  MSK: +knee pain

## 2019-11-29 NOTE — ED PROVIDER NOTE - NSFOLLOWUPCLINICS_GEN_ALL_ED_FT
[facility] [place]  [facility]  .  [place]   Phone: [phone]  Fax:   Follow Up Time: 1-3 Days

## 2019-11-29 NOTE — ED PROVIDER NOTE - ATTENDING CONTRIBUTION TO CARE
Agree w qPA documentation by demetria as well.    59y M hx fo VTE on Eliquis here with L knee pain and swelling after injury while playing tennis 2 days ago. Pt now noting swelling and pain to back of knee which is difficult to localize. Also states that when he bends knee he has pressure to suprapatellar region. Pt has suprapatellar knee joint effusion which is likely bloody effusion given AC. Pt has no TTP along medial or lateral joint line. No appreciable laxity. Periph NV intact. Xray neg for acute fracture or dislocation. Suspect soft tissue injury ie meniscus vs ligamentous. Will place in knee immobilizer, crutches and advise OP ortho FU for further mgmt.

## 2019-11-29 NOTE — ED PROVIDER NOTE - PATIENT PORTAL LINK FT
You can access the FollowMyHealth Patient Portal offered by [facility] by registering at the following website: [email or web address]. By joining [facility]’s FollowMyHealth portal, you will also be able to view your health information using other applications (apps) compatible with our system.

## 2019-11-29 NOTE — ED PROVIDER NOTE - CLINICAL SUMMARY MEDICAL DECISION MAKING FREE TEXT BOX
60 y/o M presenting with left knee pain. Concern for fx vs muscular tear. Will obtain xrays to evaluate. Patient likely to be splinted and DC home with ortho follow up. - David Urbina, DO PGY-1

## 2019-11-29 NOTE — ED PROVIDER NOTE - OBJECTIVE STATEMENT
60 y/o M with PMH of DVT/PE on Eliquis presenting with left knee pain after playing tennis on 11/27. Patient states he twisted his knee and hear a click/pop. Denies falling. Patient states he is unable to full bear weight. Able to ambulate with some difficulty. States pain is mostly on left lateral knee superiorly, worse with minimal flexion of knee. Denies any numbness or weakness. Denies fevers. n/v.

## 2019-11-29 NOTE — ED PROVIDER NOTE - PHYSICAL EXAMINATION
gen: well appearing  Mentation: AAO x 3  psych: mood appropriate  ENT: airway patent  Eyes: conjunctivae clear bilaterally  Cardio: RRR, no m/r/g  Resp: normal BS b/l  GI: s/nt/nd   Neuro: sensation and motor function intact, CN 2-12 intact  Skin: No evidence of rash  MSK: swelling of left knee, TTP over inferior border of quadriceps, decreased flexion of left knee, no increased laxity or pain with rotation of leg; all other extremity movements grossly normal  Lymph/Vasc: no LE edema

## 2019-11-29 NOTE — ED ADULT NURSE NOTE - OBJECTIVE STATEMENT
59 y Male A&Ox3 came to ED c/o knee pain/injury.  PMH of PE. Pt states he was playing tennis Wednesday when he felt a "twinge" on L knee and tried to "walk it off". Pt states he started to see swelling of knee starting Thursday and now has trouble putting weight on L knee and leg.  Pt presents with swelling above and behind L knee, no pain on rest but painful on bearing weight. +2 pulses in all extremities, Pt has no other complaints at the moment.

## 2020-02-16 NOTE — ED ADULT NURSE NOTE - CAS EDN INTEG ASSESS
Patient admitted for SAH s/p clipping and EVD removal.
s/p SAH, R Pcomm clipping, No bone flap on right side. S/p EVD removal at bedside today 2/15 at 1500
WDL

## 2020-05-27 NOTE — DISCHARGE NOTE ADULT - NS MD DC FALL RISK RISK
For information on Fall & Injury Prevention, visit [email or web address] no TWI

## 2022-08-09 ENCOUNTER — APPOINTMENT (OUTPATIENT)
Dept: DERMATOLOGY | Facility: CLINIC | Age: 62
End: 2022-08-09

## 2022-08-09 VITALS — WEIGHT: 240 LBS | BODY MASS INDEX: 31.81 KG/M2 | HEIGHT: 73 IN

## 2022-08-09 DIAGNOSIS — D48.5 NEOPLASM OF UNCERTAIN BEHAVIOR OF SKIN: ICD-10-CM

## 2022-08-09 DIAGNOSIS — Z12.83 ENCOUNTER FOR SCREENING FOR MALIGNANT NEOPLASM OF SKIN: ICD-10-CM

## 2022-08-09 DIAGNOSIS — L57.0 ACTINIC KERATOSIS: ICD-10-CM

## 2022-08-09 PROBLEM — Z00.00 ENCOUNTER FOR PREVENTIVE HEALTH EXAMINATION: Status: ACTIVE | Noted: 2022-08-09

## 2022-08-09 PROCEDURE — 17000 DESTRUCT PREMALG LESION: CPT

## 2022-08-09 PROCEDURE — 17003 DESTRUCT PREMALG LES 2-14: CPT

## 2022-08-09 PROCEDURE — 99203 OFFICE O/P NEW LOW 30 MIN: CPT | Mod: 25

## 2022-09-13 ENCOUNTER — APPOINTMENT (OUTPATIENT)
Dept: SURGERY | Facility: CLINIC | Age: 62
End: 2022-09-13

## 2022-09-13 VITALS
WEIGHT: 240 LBS | RESPIRATION RATE: 17 BRPM | HEART RATE: 96 BPM | BODY MASS INDEX: 31.81 KG/M2 | SYSTOLIC BLOOD PRESSURE: 118 MMHG | OXYGEN SATURATION: 98 % | HEIGHT: 73 IN | TEMPERATURE: 97.2 F | DIASTOLIC BLOOD PRESSURE: 75 MMHG

## 2022-09-13 DIAGNOSIS — K40.90 UNILATERAL INGUINAL HERNIA, W/OUT OBSTRUCTION OR GANGRENE, NOT SPECIFIED AS RECURRENT: ICD-10-CM

## 2022-09-13 PROCEDURE — 99204 OFFICE O/P NEW MOD 45 MIN: CPT

## 2022-09-13 NOTE — ASSESSMENT
[FreeTextEntry1] : I had a long and detailed discussion with the patient about the repair of this left inguinal hernia.  The patient understands that we will be done as an outpatient at the [facility].  The patient also understands that mesh will be used in the repair and that prior to starting the operation but after he is sedated anesthesia will do a right-sided tap block to help for postoperative pain control.\par \par The patient also understands that he will need a medical evaluation by Dr. Chivo Rubio his primary care physician prior to any surgery.  The patient understands that he will have to come off Eliquis at least 2 to 3 days prior to surgery.

## 2022-09-13 NOTE — PHYSICAL EXAM
[JVD] : no jugular venous distention  [Normal Breath Sounds] : Normal breath sounds [Normal Heart Sounds] : normal heart sounds [Abdomen Tenderness] : ~T ~M No abdominal tenderness [No Rash or Lesion] : No rash or lesion [Alert] : alert [Oriented to Person] : oriented to person [Oriented to Place] : oriented to place [Oriented to Time] : oriented to time [Calm] : calm [de-identified] : Well-developed well-nourished white male in no acute distress [de-identified] : WithIn normal limits cranial nerves II through XII intact  [de-identified] : There is a reducible left inguinal hernia present the left cord and testicle are normal.  Examination of the right groin fails to reveal any right inguinal hernia.

## 2022-09-13 NOTE — CONSULT LETTER
[Dear  ___] : Dear  [unfilled], [Consult Letter:] : I had the pleasure of evaluating your patient, [unfilled]. [Please see my note below.] : Please see my note below. [Consult Closing:] : Thank you very much for allowing me to participate in the care of this patient.  If you have any questions, please do not hesitate to contact me. [Sincerely,] : Sincerely, [FreeTextEntry3] : I have reviewed all the documentation for this encounter with the patient and have edited where appropriate\par \par Dr. Carl Lockwood

## 2022-09-13 NOTE — HISTORY OF PRESENT ILLNESS
[de-identified] : Cole is a 61 y/o male here for consultation of left inguinal hernia. About 1 month ago pt noticed lump on the left side of the groin. Today patient reports no pain. No fevers or chills. No nausea or vomiting. Good appetite. BM are normal.  Patient has a history of right-sided DVT for which she has been on Eliquis.  According to the patient he is being weaned off the Eliquis at this time

## 2022-09-22 ENCOUNTER — APPOINTMENT (OUTPATIENT)
Dept: DERMATOLOGY | Facility: CLINIC | Age: 62
End: 2022-09-22

## 2022-09-22 ENCOUNTER — OUTPATIENT (OUTPATIENT)
Dept: OUTPATIENT SERVICES | Facility: HOSPITAL | Age: 62
LOS: 1 days | End: 2022-09-22
Payer: COMMERCIAL

## 2022-09-22 VITALS
RESPIRATION RATE: 16 BRPM | TEMPERATURE: 99 F | SYSTOLIC BLOOD PRESSURE: 103 MMHG | DIASTOLIC BLOOD PRESSURE: 70 MMHG | HEIGHT: 73 IN | WEIGHT: 240.08 LBS | HEART RATE: 60 BPM | OXYGEN SATURATION: 98 %

## 2022-09-22 DIAGNOSIS — Z01.818 ENCOUNTER FOR OTHER PREPROCEDURAL EXAMINATION: ICD-10-CM

## 2022-09-22 DIAGNOSIS — K40.90 UNILATERAL INGUINAL HERNIA, WITHOUT OBSTRUCTION OR GANGRENE, NOT SPECIFIED AS RECURRENT: ICD-10-CM

## 2022-09-22 DIAGNOSIS — I26.99 OTHER PULMONARY EMBOLISM WITHOUT ACUTE COR PULMONALE: ICD-10-CM

## 2022-09-22 DIAGNOSIS — Z96.643 PRESENCE OF ARTIFICIAL HIP JOINT, BILATERAL: Chronic | ICD-10-CM

## 2022-09-22 DIAGNOSIS — Z98.890 OTHER SPECIFIED POSTPROCEDURAL STATES: Chronic | ICD-10-CM

## 2022-09-22 LAB
ANION GAP SERPL CALC-SCNC: 9 MMOL/L — SIGNIFICANT CHANGE UP (ref 5–17)
BUN SERPL-MCNC: 23 MG/DL — SIGNIFICANT CHANGE UP (ref 7–23)
CALCIUM SERPL-MCNC: 9.1 MG/DL — SIGNIFICANT CHANGE UP (ref 8.4–10.5)
CHLORIDE SERPL-SCNC: 102 MMOL/L — SIGNIFICANT CHANGE UP (ref 96–108)
CO2 SERPL-SCNC: 26 MMOL/L — SIGNIFICANT CHANGE UP (ref 22–31)
CREAT SERPL-MCNC: 1.07 MG/DL — SIGNIFICANT CHANGE UP (ref 0.5–1.3)
EGFR: 79 ML/MIN/1.73M2 — SIGNIFICANT CHANGE UP
GLUCOSE SERPL-MCNC: 86 MG/DL — SIGNIFICANT CHANGE UP (ref 70–99)
HCT VFR BLD CALC: 45.8 % — SIGNIFICANT CHANGE UP (ref 39–50)
HGB BLD-MCNC: 14.9 G/DL — SIGNIFICANT CHANGE UP (ref 13–17)
MCHC RBC-ENTMCNC: 29.7 PG — SIGNIFICANT CHANGE UP (ref 27–34)
MCHC RBC-ENTMCNC: 32.5 GM/DL — SIGNIFICANT CHANGE UP (ref 32–36)
MCV RBC AUTO: 91.4 FL — SIGNIFICANT CHANGE UP (ref 80–100)
NRBC # BLD: 0 /100 WBCS — SIGNIFICANT CHANGE UP (ref 0–0)
PLATELET # BLD AUTO: 180 K/UL — SIGNIFICANT CHANGE UP (ref 150–400)
POTASSIUM SERPL-MCNC: 4.2 MMOL/L — SIGNIFICANT CHANGE UP (ref 3.5–5.3)
POTASSIUM SERPL-SCNC: 4.2 MMOL/L — SIGNIFICANT CHANGE UP (ref 3.5–5.3)
RBC # BLD: 5.01 M/UL — SIGNIFICANT CHANGE UP (ref 4.2–5.8)
RBC # FLD: 13.2 % — SIGNIFICANT CHANGE UP (ref 10.3–14.5)
SARS-COV-2 RNA SPEC QL NAA+PROBE: SIGNIFICANT CHANGE UP
SODIUM SERPL-SCNC: 137 MMOL/L — SIGNIFICANT CHANGE UP (ref 135–145)
WBC # BLD: 6.44 K/UL — SIGNIFICANT CHANGE UP (ref 3.8–10.5)
WBC # FLD AUTO: 6.44 K/UL — SIGNIFICANT CHANGE UP (ref 3.8–10.5)

## 2022-09-22 PROCEDURE — U0005: CPT

## 2022-09-22 PROCEDURE — U0003: CPT

## 2022-09-22 PROCEDURE — 80048 BASIC METABOLIC PNL TOTAL CA: CPT

## 2022-09-22 PROCEDURE — G0463: CPT

## 2022-09-22 PROCEDURE — 85027 COMPLETE CBC AUTOMATED: CPT

## 2022-09-22 NOTE — H&P PST ADULT - NSICDXPASTMEDICALHX_GEN_ALL_CORE_FT
PAST MEDICAL HISTORY:  COVID-19 virus infection 12/2021 with mild symptoms    Cyst, baker's knee, right     PE (pulmonary thromboembolism) 2019 on Eliquis

## 2022-09-22 NOTE — H&P PST ADULT - HISTORY OF PRESENT ILLNESS
61 yo M with h/o PE (2019- on Eliquis) c/o left groin bulge x 2 months. Had surgical consult- inguinal hernia- scheduled for left inguinal hernia repair on 9/26/22  **Pt denies any fever, chills, CP/SOB or sick contacts  **Covid 19 PCR done on 9/22/22

## 2022-09-22 NOTE — H&P PST ADULT - NSICDXPASTSURGICALHX_GEN_ALL_CORE_FT
PAST SURGICAL HISTORY:  H/O bilateral hip replacements Left 2012, right 12/2021    S/P lumbar laminectomy 2010

## 2022-09-23 RX ORDER — SODIUM CHLORIDE 9 MG/ML
3 INJECTION INTRAMUSCULAR; INTRAVENOUS; SUBCUTANEOUS EVERY 8 HOURS
Refills: 0 | Status: DISCONTINUED | OUTPATIENT
Start: 2022-09-26 | End: 2022-10-10

## 2022-09-23 RX ORDER — SODIUM CHLORIDE 9 MG/ML
1000 INJECTION, SOLUTION INTRAVENOUS
Refills: 0 | Status: DISCONTINUED | OUTPATIENT
Start: 2022-09-26 | End: 2022-10-10

## 2022-09-25 ENCOUNTER — TRANSCRIPTION ENCOUNTER (OUTPATIENT)
Age: 62
End: 2022-09-25

## 2022-09-26 ENCOUNTER — RESULT REVIEW (OUTPATIENT)
Age: 62
End: 2022-09-26

## 2022-09-26 ENCOUNTER — OUTPATIENT (OUTPATIENT)
Dept: OUTPATIENT SERVICES | Facility: HOSPITAL | Age: 62
LOS: 1 days | End: 2022-09-26
Payer: COMMERCIAL

## 2022-09-26 ENCOUNTER — TRANSCRIPTION ENCOUNTER (OUTPATIENT)
Age: 62
End: 2022-09-26

## 2022-09-26 ENCOUNTER — APPOINTMENT (OUTPATIENT)
Dept: SURGERY | Facility: HOSPITAL | Age: 62
End: 2022-09-26

## 2022-09-26 VITALS
HEART RATE: 70 BPM | DIASTOLIC BLOOD PRESSURE: 79 MMHG | TEMPERATURE: 97 F | HEIGHT: 73 IN | RESPIRATION RATE: 15 BRPM | SYSTOLIC BLOOD PRESSURE: 120 MMHG | OXYGEN SATURATION: 100 % | WEIGHT: 240.08 LBS

## 2022-09-26 VITALS
HEART RATE: 64 BPM | OXYGEN SATURATION: 99 % | DIASTOLIC BLOOD PRESSURE: 73 MMHG | RESPIRATION RATE: 12 BRPM | SYSTOLIC BLOOD PRESSURE: 115 MMHG | TEMPERATURE: 98 F

## 2022-09-26 DIAGNOSIS — K40.90 UNILATERAL INGUINAL HERNIA, WITHOUT OBSTRUCTION OR GANGRENE, NOT SPECIFIED AS RECURRENT: ICD-10-CM

## 2022-09-26 DIAGNOSIS — Z98.890 OTHER SPECIFIED POSTPROCEDURAL STATES: Chronic | ICD-10-CM

## 2022-09-26 DIAGNOSIS — Z96.643 PRESENCE OF ARTIFICIAL HIP JOINT, BILATERAL: Chronic | ICD-10-CM

## 2022-09-26 PROCEDURE — C1781: CPT

## 2022-09-26 PROCEDURE — 55520 REMOVAL OF SPERM CORD LESION: CPT | Mod: 59,LT

## 2022-09-26 PROCEDURE — 49505 PRP I/HERN INIT REDUC >5 YR: CPT | Mod: LT

## 2022-09-26 PROCEDURE — 88304 TISSUE EXAM BY PATHOLOGIST: CPT | Mod: 26

## 2022-09-26 PROCEDURE — 88304 TISSUE EXAM BY PATHOLOGIST: CPT

## 2022-09-26 DEVICE — MESH HERNIA PARIETEX PROGRIP 12 X 8CM LEFT: Type: IMPLANTABLE DEVICE | Status: FUNCTIONAL

## 2022-09-26 RX ORDER — HYDROMORPHONE HYDROCHLORIDE 2 MG/ML
0.25 INJECTION INTRAMUSCULAR; INTRAVENOUS; SUBCUTANEOUS
Refills: 0 | Status: DISCONTINUED | OUTPATIENT
Start: 2022-09-26 | End: 2022-09-26

## 2022-09-26 RX ORDER — HYDROCODONE BITARTRATE AND ACETAMINOPHEN 7.5; 325 MG/15ML; MG/15ML
1 SOLUTION ORAL
Qty: 10 | Refills: 0
Start: 2022-09-26 | End: 2022-09-27

## 2022-09-26 RX ORDER — CHLORHEXIDINE GLUCONATE 213 G/1000ML
1 SOLUTION TOPICAL ONCE
Refills: 0 | Status: COMPLETED | OUTPATIENT
Start: 2022-09-26 | End: 2022-09-26

## 2022-09-26 RX ORDER — CEFAZOLIN SODIUM 1 G
2000 VIAL (EA) INJECTION ONCE
Refills: 0 | Status: COMPLETED | OUTPATIENT
Start: 2022-09-26 | End: 2022-09-26

## 2022-09-26 RX ORDER — ONDANSETRON 8 MG/1
4 TABLET, FILM COATED ORAL ONCE
Refills: 0 | Status: DISCONTINUED | OUTPATIENT
Start: 2022-09-26 | End: 2022-10-10

## 2022-09-26 RX ORDER — OXYCODONE HYDROCHLORIDE 5 MG/1
5 TABLET ORAL ONCE
Refills: 0 | Status: DISCONTINUED | OUTPATIENT
Start: 2022-09-26 | End: 2022-09-26

## 2022-09-26 RX ADMIN — SODIUM CHLORIDE 100 MILLILITER(S): 9 INJECTION, SOLUTION INTRAVENOUS at 10:22

## 2022-09-26 RX ADMIN — SODIUM CHLORIDE 3 MILLILITER(S): 9 INJECTION INTRAMUSCULAR; INTRAVENOUS; SUBCUTANEOUS at 10:21

## 2022-09-26 RX ADMIN — CHLORHEXIDINE GLUCONATE 1 APPLICATION(S): 213 SOLUTION TOPICAL at 10:25

## 2022-09-26 NOTE — BRIEF OPERATIVE NOTE - OPERATION/FINDINGS
Inguinal hernia repair, open. The hernia defect was repaired using mesh. Cord lipoma removed and sent as specimen. No intraoperative complications with mesh in place over defect.

## 2022-09-26 NOTE — ASU DISCHARGE PLAN (ADULT/PEDIATRIC) - CALL YOUR DOCTOR IF YOU HAVE ANY OF THE FOLLOWING:
Bleeding that does not stop/Swelling that gets worse/Pain not relieved by Medications/Wound/Surgical Site with redness, or foul smelling discharge or pus/Nausea and vomiting that does not stop/Unable to urinate/Inability to tolerate liquids or foods

## 2022-09-26 NOTE — ASU DISCHARGE PLAN (ADULT/PEDIATRIC) - CARE PROVIDER_API CALL
Carl Lockwood)  Surgery  [address], [address]  [place] [address]  Phone: [phone]  Fax: [phone]  Follow Up Time: 1 week

## 2022-09-26 NOTE — BRIEF OPERATIVE NOTE - NSICDXBRIEFPROCEDURE_GEN_ALL_CORE_FT
PROCEDURES:  Repair, hernia, inguinal, open, using mesh, adult 26-Sep-2022 14:20:25 left Servando Bolivar

## 2022-09-26 NOTE — ASU DISCHARGE PLAN (ADULT/PEDIATRIC) - NS MD DC FALL RISK RISK
For information on Fall & Injury Prevention, visit: [email or web address] OR  [email or web address] OR  https://www.cdc.gov/steadi/patient.html

## 2022-09-26 NOTE — ASU DISCHARGE PLAN (ADULT/PEDIATRIC) - NURSING INSTRUCTIONS
Tylenol/acetaminophen  as needed for pain/discomfort.  NEXT DOSE:   Tylenol   OR   Tylenol-containing medication  OK @  6:40pm this evening, if needed.  Please read and follow preprinted, MD-specific instruction sheet provided.  Follow up with MD as requested; call office for appointment. Tylenol/acetaminophen  as needed for pain/discomfort.  NEXT DOSE:   Tylenol   OR   Tylenol-containing medication  OK @  6:40pm this evening, if needed.  Please read and follow preprinted, MD-specific instruction sheet provided.  Follow up with MD as requested; call office for appointment.    Resume Eliquis on Wednesday as per Dr Lockwood

## 2022-09-27 PROBLEM — U07.1 COVID-19: Chronic | Status: ACTIVE | Noted: 2022-09-22

## 2022-09-27 PROBLEM — I26.99 OTHER PULMONARY EMBOLISM WITHOUT ACUTE COR PULMONALE: Chronic | Status: ACTIVE | Noted: 2019-11-29

## 2022-09-27 PROBLEM — M71.21 SYNOVIAL CYST OF POPLITEAL SPACE [BAKER], RIGHT KNEE: Chronic | Status: ACTIVE | Noted: 2022-09-22

## 2022-10-03 LAB — SURGICAL PATHOLOGY STUDY: SIGNIFICANT CHANGE UP

## 2022-10-04 ENCOUNTER — APPOINTMENT (OUTPATIENT)
Dept: SURGERY | Facility: CLINIC | Age: 62
End: 2022-10-04

## 2022-10-04 VITALS
HEART RATE: 90 BPM | RESPIRATION RATE: 17 BRPM | SYSTOLIC BLOOD PRESSURE: 113 MMHG | DIASTOLIC BLOOD PRESSURE: 75 MMHG | TEMPERATURE: 97.1 F | OXYGEN SATURATION: 98 %

## 2022-10-04 PROCEDURE — 99024 POSTOP FOLLOW-UP VISIT: CPT

## 2022-10-04 RX ORDER — APIXABAN 5 MG/1
5 TABLET, FILM COATED ORAL
Refills: 0 | Status: ACTIVE | COMMUNITY

## 2022-10-04 RX ORDER — FLUOROURACIL 50 MG/G
5 CREAM TOPICAL
Qty: 1 | Refills: 0 | Status: DISCONTINUED | COMMUNITY
Start: 2022-08-09 | End: 2022-10-04

## 2022-10-04 NOTE — HISTORY OF PRESENT ILLNESS
[de-identified] : Cole is a 61 y/o male here for a post-op visit. S/P open right inguinal hernia repair with mesh on 9/26/22. Pathology - "Lipoma".\par \par Today pt report soreness of incision site. Denies swelling, drainage (since Sunday), and redness of surgical incision.  Denies nausea and vomiting. Denies fever and chills. Daily BM, soft, no bleeding, no straining. Good appetite. Does take Eliquis, hx of PE.

## 2022-10-04 NOTE — ASSESSMENT
[FreeTextEntry1] : Wound care and activity were discussed with the patient.  He is to return in 1 month.

## 2022-10-04 NOTE — PHYSICAL EXAM
[de-identified] : The left inguinal hernia repair is intact.  The surgical wound is clean and dry.  Left cord and testicle are normal.  There is some induration in the subcutaneous area which is normal and I have told the patient it may take months for that to get better.

## 2022-11-01 ENCOUNTER — APPOINTMENT (OUTPATIENT)
Dept: SURGERY | Facility: CLINIC | Age: 62
End: 2022-11-01

## 2022-11-01 VITALS
SYSTOLIC BLOOD PRESSURE: 115 MMHG | DIASTOLIC BLOOD PRESSURE: 69 MMHG | HEART RATE: 75 BPM | OXYGEN SATURATION: 97 % | TEMPERATURE: 97.1 F | RESPIRATION RATE: 17 BRPM

## 2022-11-01 DIAGNOSIS — Z98.890 OTHER SPECIFIED POSTPROCEDURAL STATES: ICD-10-CM

## 2022-11-01 PROCEDURE — 99024 POSTOP FOLLOW-UP VISIT: CPT

## 2022-11-01 RX ORDER — APIXABAN 2.5 MG/1
2.5 TABLET, FILM COATED ORAL
Qty: 60 | Refills: 0 | Status: DISCONTINUED | COMMUNITY
Start: 2022-10-29

## 2022-11-01 NOTE — HISTORY OF PRESENT ILLNESS
[de-identified] : Cole is a 63 y/o male here for a post-op visit. S/P open right inguinal hernia repair with mesh on 9/26/22. Pathology - "Lipoma" of cord and hernia sac, excision: mesothelial - lined connective tissue with focal mesothelial hyperplasia and reactive changes. Benign mature fibroadipose tissue.  At the told the patient to limit his activity and apply ice to the area and take anti-inflammatories as needed last seen 10/04/2022  Pt denies feeling pain today. Denies drainage, swelling, or bleeding of incision site. Some areas of incision feels numb. Denies nausea and vomiting. Denies fever and chills. Daily BM, formed, no bleeding, no straining. Good appetite. Does take Eliquis, hx of PE. \par  \par \par \par

## 2022-11-01 NOTE — PHYSICAL EXAM
[de-identified] : There is still a mild amount of induration in the operative field.  The repair is intact the cord and testicle are normal there is some numbness underneath the incision which may shrink down to a smaller area as the trauma of surgery abates.  Since surgery the patient has been on restricted activity applying ice and anti-inflammatories to the operative area.  I have given him a letter that he may return to work on the 14th as it approximately 2 weeks from now the induration should have abated completely.

## 2022-11-08 ENCOUNTER — APPOINTMENT (OUTPATIENT)
Dept: DERMATOLOGY | Facility: CLINIC | Age: 62
End: 2022-11-08

## 2022-12-15 NOTE — ED ADULT NURSE REASSESSMENT NOTE - TEMPLATE LIST FOR HEAD TO TOE ASSESSMENT
"Nutrition services: Day 1 of admit.  Henry Anand is a 23 y.o. female with admitting DX of Sickle cell crisis (HCC) [D57.00]    Consult received for MST 2 for 2-13 lb wt loss x >1 year and poor PO intake PTA. During bedside interview, pt reports lost 15 lb and is unsure when or timeframe in which wt loss occurred. Pt reports loss of appetite, but is unable to offer further details; visible physical discomfort during interview, verbalized desire to go home. Pt's mother later arrived, reports pt's wt loss was a few months ago which thinks was related to stress. Pt only interested in eating fruit at this time; declined offered nutrition interventions.    Assessment:  Height: 157.5 cm (5' 2\")  Weight: 60.9 kg (134 lb 4.2 oz)  Body mass index is 24.56 kg/m²., BMI classification: Normal  Diet/Intake: Regular; meals not yet documented    Evaluation:   Hx sickle cell anemia, asthma, gallstones, anxiety, depression, cholecystectomy. Presents w/ low back pain radiating toward buttocks x 2 days PTA, worsening on day of admit.  Labs: BUN 5, crea 0.40  MAR: NS @ 125 ml/hr, senna, folvite, tylenol PRN, PCA pain management  Nutrition-focused physical exam (NFPE) did not reveal any signs of fat or muscle wasting.  Per chart hx, appears 7.6% wt loss (119 lb down to 110 lb) in 1 month from 7/1-8/4 of this year. Wt loss is significant.    Malnutrition Risk: Reported wt loss a few months ago per pt's mother; per chart review, wt loss is significant. Unable to meet 2 ASPEN criteria w/ pt interview/NFPE.    Recommendations/Plan:  Encourage intake of >/=75% of meals. Pt declining offered interventions at this time, requesting fruit w/ meals.  Document intake of all PO as % taken in ADL's to provide interdisciplinary communication across all shifts.   Monitor weight.  Nutrition rep will continue to see patient for ongoing meal and snack preferences.     RD following.  "
Neuro
Neuro

## 2023-01-17 ENCOUNTER — APPOINTMENT (OUTPATIENT)
Dept: DERMATOLOGY | Facility: CLINIC | Age: 63
End: 2023-01-17
Payer: COMMERCIAL

## 2023-01-17 VITALS — BODY MASS INDEX: 31.81 KG/M2 | HEIGHT: 73 IN | WEIGHT: 240 LBS

## 2023-01-17 DIAGNOSIS — R23.8 OTHER SKIN CHANGES: ICD-10-CM

## 2023-01-17 DIAGNOSIS — L81.0 POSTINFLAMMATORY HYPERPIGMENTATION: ICD-10-CM

## 2023-01-17 PROCEDURE — 99213 OFFICE O/P EST LOW 20 MIN: CPT

## 2023-01-24 NOTE — HISTORY OF PRESENT ILLNESS
[FreeTextEntry1] : rp spots [de-identified] : Referred by: Dr. briseno \par \par Mr. JOSE MOLINA  is a 62 year old M last seen 08/2022, here for f/u of:\par #spot on R cheek x mo, treated with 3 weeks of bid Efudex. Thinks it has improved significantly\par #spot on chest noted at last visit c/f NMSC vs AK. Pt instructed to apply Efudex to the area but did not as was unsure where area of concern was\par \par \par \par \par

## 2023-01-24 NOTE — PHYSICAL EXAM
[FreeTextEntry3] : Exam notable for:\par \par Faintly hyperpigmented patch on right cheek\par chest clear today

## 2023-01-24 NOTE — ASSESSMENT
[FreeTextEntry1] : # Post-inflammatory hyperpigmentation on R cheek s/p Efudex treatment\par - No residual area of concern\par - Instructed to inform us if lesion ever recurs\par - Will check on site at next FBSE visit\par \par # Skin papule of chest\par - appears resolved today\par - no need for any interventions at this point\par \par RTC Aug 2023 for FBSE

## 2023-04-18 ENCOUNTER — INPATIENT (INPATIENT)
Facility: HOSPITAL | Age: 63
LOS: 0 days | Discharge: HOME | DRG: 247 | End: 2023-04-19
Attending: STUDENT IN AN ORGANIZED HEALTH CARE EDUCATION/TRAINING PROGRAM | Admitting: STUDENT IN AN ORGANIZED HEALTH CARE EDUCATION/TRAINING PROGRAM
Payer: COMMERCIAL

## 2023-04-18 VITALS
OXYGEN SATURATION: 97 % | DIASTOLIC BLOOD PRESSURE: 112 MMHG | RESPIRATION RATE: 20 BRPM | HEIGHT: 75 IN | WEIGHT: 240.08 LBS | HEART RATE: 55 BPM | SYSTOLIC BLOOD PRESSURE: 164 MMHG | TEMPERATURE: 98 F

## 2023-04-18 DIAGNOSIS — Z96.643 PRESENCE OF ARTIFICIAL HIP JOINT, BILATERAL: Chronic | ICD-10-CM

## 2023-04-18 DIAGNOSIS — I21.3 ST ELEVATION (STEMI) MYOCARDIAL INFARCTION OF UNSPECIFIED SITE: ICD-10-CM

## 2023-04-18 DIAGNOSIS — Z98.890 OTHER SPECIFIED POSTPROCEDURAL STATES: Chronic | ICD-10-CM

## 2023-04-18 LAB
ALBUMIN SERPL ELPH-MCNC: 3.6 G/DL — SIGNIFICANT CHANGE UP (ref 3.3–5)
ALBUMIN SERPL ELPH-MCNC: 4.4 G/DL — SIGNIFICANT CHANGE UP (ref 3.3–5)
ALP SERPL-CCNC: 108 U/L — SIGNIFICANT CHANGE UP (ref 40–120)
ALP SERPL-CCNC: 87 U/L — SIGNIFICANT CHANGE UP (ref 40–120)
ALT FLD-CCNC: 22 U/L — SIGNIFICANT CHANGE UP (ref 10–45)
ALT FLD-CCNC: 33 U/L — SIGNIFICANT CHANGE UP (ref 10–45)
ANION GAP SERPL CALC-SCNC: 13 MMOL/L — SIGNIFICANT CHANGE UP (ref 5–17)
ANION GAP SERPL CALC-SCNC: 13 MMOL/L — SIGNIFICANT CHANGE UP (ref 5–17)
APTT BLD: 27.8 SEC — SIGNIFICANT CHANGE UP (ref 27.5–35.5)
AST SERPL-CCNC: 172 U/L — HIGH (ref 10–40)
AST SERPL-CCNC: 27 U/L — SIGNIFICANT CHANGE UP (ref 10–40)
BASOPHILS # BLD AUTO: 0.03 K/UL — SIGNIFICANT CHANGE UP (ref 0–0.2)
BASOPHILS # BLD AUTO: 0.05 K/UL — SIGNIFICANT CHANGE UP (ref 0–0.2)
BASOPHILS NFR BLD AUTO: 0.3 % — SIGNIFICANT CHANGE UP (ref 0–2)
BASOPHILS NFR BLD AUTO: 0.7 % — SIGNIFICANT CHANGE UP (ref 0–2)
BILIRUB SERPL-MCNC: 0.4 MG/DL — SIGNIFICANT CHANGE UP (ref 0.2–1.2)
BILIRUB SERPL-MCNC: 0.5 MG/DL — SIGNIFICANT CHANGE UP (ref 0.2–1.2)
BLD GP AB SCN SERPL QL: NEGATIVE — SIGNIFICANT CHANGE UP
BUN SERPL-MCNC: 17 MG/DL — SIGNIFICANT CHANGE UP (ref 7–23)
BUN SERPL-MCNC: 18 MG/DL — SIGNIFICANT CHANGE UP (ref 7–23)
CALCIUM SERPL-MCNC: 8.5 MG/DL — SIGNIFICANT CHANGE UP (ref 8.4–10.5)
CALCIUM SERPL-MCNC: 9.3 MG/DL — SIGNIFICANT CHANGE UP (ref 8.4–10.5)
CHLORIDE SERPL-SCNC: 100 MMOL/L — SIGNIFICANT CHANGE UP (ref 96–108)
CHLORIDE SERPL-SCNC: 101 MMOL/L — SIGNIFICANT CHANGE UP (ref 96–108)
CK MB BLD-MCNC: 6.1 % — HIGH (ref 0–3.5)
CK MB CFR SERPL CALC: 92.4 NG/ML — HIGH (ref 0–6.7)
CK SERPL-CCNC: 1505 U/L — HIGH (ref 30–200)
CO2 SERPL-SCNC: 22 MMOL/L — SIGNIFICANT CHANGE UP (ref 22–31)
CO2 SERPL-SCNC: 24 MMOL/L — SIGNIFICANT CHANGE UP (ref 22–31)
CREAT SERPL-MCNC: 1.04 MG/DL — SIGNIFICANT CHANGE UP (ref 0.5–1.3)
CREAT SERPL-MCNC: 1.15 MG/DL — SIGNIFICANT CHANGE UP (ref 0.5–1.3)
EGFR: 72 ML/MIN/1.73M2 — SIGNIFICANT CHANGE UP
EGFR: 82 ML/MIN/1.73M2 — SIGNIFICANT CHANGE UP
EOSINOPHIL # BLD AUTO: 0.03 K/UL — SIGNIFICANT CHANGE UP (ref 0–0.5)
EOSINOPHIL # BLD AUTO: 0.12 K/UL — SIGNIFICANT CHANGE UP (ref 0–0.5)
EOSINOPHIL NFR BLD AUTO: 0.3 % — SIGNIFICANT CHANGE UP (ref 0–6)
EOSINOPHIL NFR BLD AUTO: 1.6 % — SIGNIFICANT CHANGE UP (ref 0–6)
GLUCOSE SERPL-MCNC: 112 MG/DL — HIGH (ref 70–99)
GLUCOSE SERPL-MCNC: 121 MG/DL — HIGH (ref 70–99)
HCT VFR BLD CALC: 43.9 % — SIGNIFICANT CHANGE UP (ref 39–50)
HCT VFR BLD CALC: 50 % — SIGNIFICANT CHANGE UP (ref 39–50)
HGB BLD-MCNC: 14.6 G/DL — SIGNIFICANT CHANGE UP (ref 13–17)
HGB BLD-MCNC: 16.2 G/DL — SIGNIFICANT CHANGE UP (ref 13–17)
IMM GRANULOCYTES NFR BLD AUTO: 0.3 % — SIGNIFICANT CHANGE UP (ref 0–0.9)
IMM GRANULOCYTES NFR BLD AUTO: 0.5 % — SIGNIFICANT CHANGE UP (ref 0–0.9)
INR BLD: 0.97 RATIO — SIGNIFICANT CHANGE UP (ref 0.88–1.16)
LIDOCAIN IGE QN: 24 U/L — SIGNIFICANT CHANGE UP (ref 7–60)
LYMPHOCYTES # BLD AUTO: 1.29 K/UL — SIGNIFICANT CHANGE UP (ref 1–3.3)
LYMPHOCYTES # BLD AUTO: 1.98 K/UL — SIGNIFICANT CHANGE UP (ref 1–3.3)
LYMPHOCYTES # BLD AUTO: 13.1 % — SIGNIFICANT CHANGE UP (ref 13–44)
LYMPHOCYTES # BLD AUTO: 26.4 % — SIGNIFICANT CHANGE UP (ref 13–44)
MAGNESIUM SERPL-MCNC: 1.9 MG/DL — SIGNIFICANT CHANGE UP (ref 1.6–2.6)
MAGNESIUM SERPL-MCNC: 2.1 MG/DL — SIGNIFICANT CHANGE UP (ref 1.6–2.6)
MCHC RBC-ENTMCNC: 29.6 PG — SIGNIFICANT CHANGE UP (ref 27–34)
MCHC RBC-ENTMCNC: 30 PG — SIGNIFICANT CHANGE UP (ref 27–34)
MCHC RBC-ENTMCNC: 32.4 GM/DL — SIGNIFICANT CHANGE UP (ref 32–36)
MCHC RBC-ENTMCNC: 33.3 GM/DL — SIGNIFICANT CHANGE UP (ref 32–36)
MCV RBC AUTO: 90.3 FL — SIGNIFICANT CHANGE UP (ref 80–100)
MCV RBC AUTO: 91.4 FL — SIGNIFICANT CHANGE UP (ref 80–100)
MONOCYTES # BLD AUTO: 0.7 K/UL — SIGNIFICANT CHANGE UP (ref 0–0.9)
MONOCYTES # BLD AUTO: 0.78 K/UL — SIGNIFICANT CHANGE UP (ref 0–0.9)
MONOCYTES NFR BLD AUTO: 10.4 % — SIGNIFICANT CHANGE UP (ref 2–14)
MONOCYTES NFR BLD AUTO: 7.1 % — SIGNIFICANT CHANGE UP (ref 2–14)
NEUTROPHILS # BLD AUTO: 4.54 K/UL — SIGNIFICANT CHANGE UP (ref 1.8–7.4)
NEUTROPHILS # BLD AUTO: 7.79 K/UL — HIGH (ref 1.8–7.4)
NEUTROPHILS NFR BLD AUTO: 60.4 % — SIGNIFICANT CHANGE UP (ref 43–77)
NEUTROPHILS NFR BLD AUTO: 78.9 % — HIGH (ref 43–77)
NRBC # BLD: 0 /100 WBCS — SIGNIFICANT CHANGE UP (ref 0–0)
NRBC # BLD: 0 /100 WBCS — SIGNIFICANT CHANGE UP (ref 0–0)
NT-PROBNP SERPL-SCNC: 70 PG/ML — SIGNIFICANT CHANGE UP (ref 0–300)
PHOSPHATE SERPL-MCNC: 2.9 MG/DL — SIGNIFICANT CHANGE UP (ref 2.5–4.5)
PLATELET # BLD AUTO: 169 K/UL — SIGNIFICANT CHANGE UP (ref 150–400)
PLATELET # BLD AUTO: 188 K/UL — SIGNIFICANT CHANGE UP (ref 150–400)
POTASSIUM SERPL-MCNC: 4 MMOL/L — SIGNIFICANT CHANGE UP (ref 3.5–5.3)
POTASSIUM SERPL-MCNC: 5.1 MMOL/L — SIGNIFICANT CHANGE UP (ref 3.5–5.3)
POTASSIUM SERPL-SCNC: 4 MMOL/L — SIGNIFICANT CHANGE UP (ref 3.5–5.3)
POTASSIUM SERPL-SCNC: 5.1 MMOL/L — SIGNIFICANT CHANGE UP (ref 3.5–5.3)
PROT SERPL-MCNC: 6.1 G/DL — SIGNIFICANT CHANGE UP (ref 6–8.3)
PROT SERPL-MCNC: 7.2 G/DL — SIGNIFICANT CHANGE UP (ref 6–8.3)
PROTHROM AB SERPL-ACNC: 11.1 SEC — SIGNIFICANT CHANGE UP (ref 10.5–13.4)
RBC # BLD: 4.86 M/UL — SIGNIFICANT CHANGE UP (ref 4.2–5.8)
RBC # BLD: 5.47 M/UL — SIGNIFICANT CHANGE UP (ref 4.2–5.8)
RBC # FLD: 13 % — SIGNIFICANT CHANGE UP (ref 10.3–14.5)
RBC # FLD: 13 % — SIGNIFICANT CHANGE UP (ref 10.3–14.5)
RH IG SCN BLD-IMP: POSITIVE — SIGNIFICANT CHANGE UP
SARS-COV-2 RNA SPEC QL NAA+PROBE: SIGNIFICANT CHANGE UP
SODIUM SERPL-SCNC: 135 MMOL/L — SIGNIFICANT CHANGE UP (ref 135–145)
SODIUM SERPL-SCNC: 138 MMOL/L — SIGNIFICANT CHANGE UP (ref 135–145)
TROPONIN T, HIGH SENSITIVITY RESULT: 22 NG/L — SIGNIFICANT CHANGE UP (ref 0–51)
TROPONIN T, HIGH SENSITIVITY RESULT: 5285 NG/L — HIGH (ref 0–51)
WBC # BLD: 7.51 K/UL — SIGNIFICANT CHANGE UP (ref 3.8–10.5)
WBC # BLD: 9.87 K/UL — SIGNIFICANT CHANGE UP (ref 3.8–10.5)
WBC # FLD AUTO: 7.51 K/UL — SIGNIFICANT CHANGE UP (ref 3.8–10.5)
WBC # FLD AUTO: 9.87 K/UL — SIGNIFICANT CHANGE UP (ref 3.8–10.5)

## 2023-04-18 PROCEDURE — 92978 ENDOLUMINL IVUS OCT C 1ST: CPT | Mod: 26,RC

## 2023-04-18 PROCEDURE — 93308 TTE F-UP OR LMTD: CPT | Mod: 26

## 2023-04-18 PROCEDURE — 99223 1ST HOSP IP/OBS HIGH 75: CPT | Mod: GC

## 2023-04-18 PROCEDURE — 99152 MOD SED SAME PHYS/QHP 5/>YRS: CPT

## 2023-04-18 PROCEDURE — 93567 NJX CAR CTH SPRVLV AORTGRPHY: CPT

## 2023-04-18 PROCEDURE — 92941 PRQ TRLML REVSC TOT OCCL AMI: CPT | Mod: RC

## 2023-04-18 PROCEDURE — 99291 CRITICAL CARE FIRST HOUR: CPT

## 2023-04-18 PROCEDURE — 93010 ELECTROCARDIOGRAM REPORT: CPT

## 2023-04-18 PROCEDURE — 99292 CRITICAL CARE ADDL 30 MIN: CPT

## 2023-04-18 PROCEDURE — 71045 X-RAY EXAM CHEST 1 VIEW: CPT | Mod: 26

## 2023-04-18 PROCEDURE — 93458 L HRT ARTERY/VENTRICLE ANGIO: CPT | Mod: 26,59

## 2023-04-18 RX ORDER — CHLORHEXIDINE GLUCONATE 213 G/1000ML
1 SOLUTION TOPICAL
Refills: 0 | Status: DISCONTINUED | OUTPATIENT
Start: 2023-04-18 | End: 2023-04-19

## 2023-04-18 RX ORDER — CLOPIDOGREL BISULFATE 75 MG/1
75 TABLET, FILM COATED ORAL DAILY
Refills: 0 | Status: DISCONTINUED | OUTPATIENT
Start: 2023-04-20 | End: 2023-04-19

## 2023-04-18 RX ORDER — TICAGRELOR 90 MG/1
180 TABLET ORAL ONCE
Refills: 0 | Status: COMPLETED | OUTPATIENT
Start: 2023-04-18 | End: 2023-04-18

## 2023-04-18 RX ORDER — APIXABAN 2.5 MG/1
2.5 TABLET, FILM COATED ORAL EVERY 12 HOURS
Refills: 0 | Status: DISCONTINUED | OUTPATIENT
Start: 2023-04-18 | End: 2023-04-19

## 2023-04-18 RX ORDER — HEPARIN SODIUM 5000 [USP'U]/ML
4000 INJECTION INTRAVENOUS; SUBCUTANEOUS ONCE
Refills: 0 | Status: COMPLETED | OUTPATIENT
Start: 2023-04-18 | End: 2023-04-18

## 2023-04-18 RX ORDER — ASPIRIN/CALCIUM CARB/MAGNESIUM 324 MG
81 TABLET ORAL DAILY
Refills: 0 | Status: DISCONTINUED | OUTPATIENT
Start: 2023-04-19 | End: 2023-04-19

## 2023-04-18 RX ORDER — HEPARIN SODIUM 5000 [USP'U]/ML
INJECTION INTRAVENOUS; SUBCUTANEOUS
Qty: 25000 | Refills: 0 | Status: DISCONTINUED | OUTPATIENT
Start: 2023-04-18 | End: 2023-04-18

## 2023-04-18 RX ORDER — ASPIRIN/CALCIUM CARB/MAGNESIUM 324 MG
324 TABLET ORAL ONCE
Refills: 0 | Status: COMPLETED | OUTPATIENT
Start: 2023-04-18 | End: 2023-04-18

## 2023-04-18 RX ORDER — CLOPIDOGREL BISULFATE 75 MG/1
600 TABLET, FILM COATED ORAL ONCE
Refills: 0 | Status: COMPLETED | OUTPATIENT
Start: 2023-04-19 | End: 2023-04-19

## 2023-04-18 RX ORDER — HEPARIN SODIUM 5000 [USP'U]/ML
4000 INJECTION INTRAVENOUS; SUBCUTANEOUS EVERY 6 HOURS
Refills: 0 | Status: DISCONTINUED | OUTPATIENT
Start: 2023-04-18 | End: 2023-04-18

## 2023-04-18 RX ORDER — ATORVASTATIN CALCIUM 80 MG/1
80 TABLET, FILM COATED ORAL AT BEDTIME
Refills: 0 | Status: DISCONTINUED | OUTPATIENT
Start: 2023-04-18 | End: 2023-04-19

## 2023-04-18 RX ORDER — MAGNESIUM SULFATE 500 MG/ML
1 VIAL (ML) INJECTION ONCE
Refills: 0 | Status: COMPLETED | OUTPATIENT
Start: 2023-04-18 | End: 2023-04-18

## 2023-04-18 RX ADMIN — ATORVASTATIN CALCIUM 80 MILLIGRAM(S): 80 TABLET, FILM COATED ORAL at 21:24

## 2023-04-18 RX ADMIN — TICAGRELOR 180 MILLIGRAM(S): 90 TABLET ORAL at 11:30

## 2023-04-18 RX ADMIN — Medication 100 GRAM(S): at 20:18

## 2023-04-18 RX ADMIN — Medication 324 MILLIGRAM(S): at 11:30

## 2023-04-18 RX ADMIN — APIXABAN 2.5 MILLIGRAM(S): 2.5 TABLET, FILM COATED ORAL at 21:28

## 2023-04-18 RX ADMIN — HEPARIN SODIUM 4000 UNIT(S): 5000 INJECTION INTRAVENOUS; SUBCUTANEOUS at 11:36

## 2023-04-18 NOTE — ED ADULT NURSE NOTE - NSICDXPASTMEDICALHX_GEN_ALL_CORE_FT
Addended by: Max Hays on: 5/8/2019 04:07 PM     Modules accepted: Orders
PAST MEDICAL HISTORY:  COVID-19 virus infection 12/2021 with mild symptoms    Cyst, baker's knee, right     PE (pulmonary thromboembolism) 2019 on Eliquis

## 2023-04-18 NOTE — ED PROVIDER NOTE - PHYSICAL EXAMINATION
General: appears uncomfortable, AOx3  Skin: no rash, no pallor  Head: normocephalic, atraumatic  Eyes: clear conjunctiva, EOMI  ENMT: airway patent, no nasal discharge  Cardiovascular: normal rate, normal rhythm, S1/S2, 1+ edema RLE (pt reports chronic), no edema LLE  Pulmonary: clear to auscultation bilaterally, no rales, rhonchi, or wheeze  Abdomen: soft, nontender  Musculoskeletal: moving extremities well, no deformity  Psych: normal mood, normal affect General: appears uncomfortable, AOx3  Skin: no rash, no pallor  Head: normocephalic, atraumatic  Eyes: clear conjunctiva, EOMI  ENMT: airway patent, no nasal discharge  Cardiovascular: normal rate, normal rhythm, S1/S2, 1+ edema RLE (pt reports chronic), no edema LLE  Pulmonary: clear to auscultation bilaterally, no rales, rhonchi, or wheeze  Abdomen: soft, nontender  Musculoskeletal: moving extremities well, no deformity  Psych: normal mood, normal affect  Attending Danae Asencio: Gen: NAD, heent: atrauamtic, eomi, perrla, mmm, o neck; nttp, no nuchal rigidity, chest: nttp, no crepitus, cv: rrr, , lungs: ctab, abd: soft, nontender, nondistended, no peritoneal signs, no guarding, ext: wwp, RLE swelling  skin: no rash, neuro: awake and alert, following commands, speech clear, sensation and strength intact, no focal deficits

## 2023-04-18 NOTE — H&P ADULT - NSHPPHYSICALEXAM_GEN_ALL_CORE
Vital Signs Last 24 Hrs  T(C): 36.2 (18 Apr 2023 12:42), Max: 36.6 (18 Apr 2023 11:00)  T(F): 97.1 (18 Apr 2023 12:42), Max: 97.9 (18 Apr 2023 11:00)  HR: 52 (18 Apr 2023 12:45) (49 - 61)  BP: 108/60 (18 Apr 2023 12:45) (100/59 - 164/112)  BP(mean): 77 (18 Apr 2023 12:45) (76 - 77)  RR: 21 (18 Apr 2023 12:45) (20 - 23)  SpO2: 98% (18 Apr 2023 12:45) (97% - 100%)    General: Well nourished, well developed, NAD  Neurology/Psychiatric: A&Ox3. Mood and affect appropriate for situation.  Respiratory: Breath sounds CTA in all lung fields b/l. No rhonchi, rales, wheezes.  CV: Bradycardia, S1, S2. No murmurs, rubs or gallops. Denies current active chest pain.  Abdominal: Soft, non-tender, non-distended. normoactive BS. Denies abdominal pain  Extremities: No peripheral edema, 2+ peripheral pulses in UE/LE b/l. Right radial band in place s/p cath. Denies numbness/tingling in right hand.   Skin: No rashes, lesions, ulcers or discoloration noted on exam.

## 2023-04-18 NOTE — H&P ADULT - NSHPLABSRESULTS_GEN_ALL_CORE
CBC Full  -  ( 18 Apr 2023 11:27 )  WBC Count : 7.51 K/uL  RBC Count : 5.47 M/uL  Hemoglobin : 16.2 g/dL  Hematocrit : 50.0 %  Platelet Count - Automated : 188 K/uL  Mean Cell Volume : 91.4 fl  Mean Cell Hemoglobin : 29.6 pg  Mean Cell Hemoglobin Concentration : 32.4 gm/dL  Auto Neutrophil # : 4.54 K/uL  Auto Lymphocyte # : 1.98 K/uL  Auto Monocyte # : 0.78 K/uL  Auto Eosinophil # : 0.12 K/uL  Auto Basophil # : 0.05 K/uL  Auto Neutrophil % : 60.4 %  Auto Lymphocyte % : 26.4 %  Auto Monocyte % : 10.4 %  Auto Eosinophil % : 1.6 %  Auto Basophil % : 0.7 %    138  |  101  |  17  ----------------------------<  121<H>  5.1   |  24  |  1.04      Ca    9.3      18 Apr 2023 11:27  Mg     2.1     04-18    TPro  7.2  /  Alb  4.4  /  TBili  0.5  /  DBili  x   /  AST  27  /  ALT  22  /  AlkPhos  108  04-18    LIVER FUNCTIONS - ( 18 Apr 2023 11:27 )  Alb: 4.4 g/dL / Pro: 7.2 g/dL / ALK PHOS: 108 U/L / ALT: 22 U/L / AST: 27 U/L / GGT: x           PT/INR - ( 18 Apr 2023 11:27 )   PT: 11.1 sec;   INR: 0.97 ratio         PTT - ( 18 Apr 2023 11:27 )  PTT:27.8 sec

## 2023-04-18 NOTE — ED PROVIDER NOTE - CLINICAL SUMMARY MEDICAL DECISION MAKING FREE TEXT BOX
Michael Lovelace, PGY-3-   63-year-old male with history of PE, presenting for ED for evaluation of left-sided chest pain.  Started just prior to arrival.  EKG concerning for acute STEMI.  Other vitals are stable.  Patient received aspirin, Brilinta, started on heparin drip in ED.  Will go to Cath Lab.  Additional emergency department testing deferred due to emergent situation for angioplasty. Michaeleduardo Lovelace, PGY-3-   63-year-old male with history of PE, presenting for ED for evaluation of left-sided chest pain.  Started just prior to arrival.  EKG concerning for acute STEMI.  Other vitals are stable.  Patient received aspirin, Brilinta, started on heparin drip in ED.  Will go to Cath Lab.  Additional emergency department testing deferred due to emergent situation for angioplasty.  Attending Danae Asencio: 62 yo female with h/o pE in the past presenting with chest pain. upon arrival ekg performed showing st elevation in inferior leads. cath consult placed. pocus performed showing infereior wall hypokinesis and RV enlargement. pt with h/o PE. concern for acute MI. no back pain or evidence of aortic outflow tract enlargement making dissection less liekly. will admit for cath

## 2023-04-18 NOTE — ED PROVIDER NOTE - PROGRESS NOTE DETAILS
Attending Danae Asencio: cards fellow consulted upon visualization of the ekg, pt with persistent chest pain. will activate the cath lab

## 2023-04-18 NOTE — PATIENT PROFILE ADULT - FALL HARM RISK - UNIVERSAL INTERVENTIONS
Bed in lowest position, wheels locked, appropriate side rails in place/Call bell, personal items and telephone in reach/Instruct patient to call for assistance before getting out of bed or chair/Non-slip footwear when patient is out of bed/[place] to call system/Physically safe environment - no spills, clutter or unnecessary equipment/Purposeful Proactive Rounding/Room/bathroom lighting operational, light cord in reach

## 2023-04-18 NOTE — CHART NOTE - NSCHARTNOTEFT_GEN_A_CORE
Removal of Radial Band    Pulses in the Right upper extremity are palpable. The patient was placed in the supine position. The insertion site was identified and the band deflated per protocol. The radial band was removed slowly.     Monitoring of the Right wrists and both upper extremities including neuro-vascular checks and vital signs every 15 minutes x 4.    Complications: None    Comments: Patient tolerated procedure well.

## 2023-04-18 NOTE — CONSULT NOTE ADULT - ASSESSMENT
Mr. Ngo is a 63yoM with no cardiac PMH having an acute posterior wall STEMI.     #Posterior wall STEMI  Acute onset crushing CP.   ECG with VEDA in Inferior leads with STD in precordial leads with upright Twaves concerning for posterior STEMI.   He was having active CP in the ED with worsening of VEDA on ECG.   Moses to 50's but HDS.     He was loaded with ASA 324mg/Brilinta 180mg, and Heparin 4000u and taken emergently to the cath lab.

## 2023-04-18 NOTE — ED PROVIDER NOTE - NSICDXPASTMEDICALHX_GEN_ALL_CORE_FT
PAST MEDICAL HISTORY:  COVID-19 virus infection 12/2021 with mild symptoms    Cyst, baker's knee, right     PE (pulmonary thromboembolism) 2019 on Eliquis    
No

## 2023-04-18 NOTE — ED ADULT NURSE NOTE - OBJECTIVE STATEMENT
Patient is a 63 year old male complaining of chest pain that started 45 minutes ago at home at his desk. A&Ox4. Ambulatory. PMH pulmonary embolism, on Eliquis. Patient reports he was seated at home working at his computer when the chest pain started - did not take aspirin or anything for pain. Patient denies headache, dizziness, cough, SOB, abdominal pain, n/v/d, urinary symptoms, fevers, chills, weakness at this time. Cardiology fellow, MD Hernandez at bedside - plan to go to cardiac cath lab.

## 2023-04-18 NOTE — ED PROVIDER NOTE - OBJECTIVE STATEMENT
Michael Albania, PGY-3- 63-year-old male with a past medical history of pulmonary embolism, on Eliquis, presents to ED for evaluation of left-sided chest pain that started 20 to 30 minutes prior to arrival.  Patient reports he was seated at home working at his computer when the chest pain started.  Reports that pain is easing up as at this time.  Has not taken anything yet for pain.  Did not take aspirin today.  Patient without history of ACS or CAD in the past.  Patient reports recently seeing cardiology at [facility] and reports having negative work-up.  Patient denies any shortness of breath, leg edema, cough, recent travel.  Patient denies tobacco, alcohol, recreational drug use.  Patient denies any medication allergies.  Patient does note that he is under increased stress.  Works as a .

## 2023-04-18 NOTE — H&P ADULT - HISTORY OF PRESENT ILLNESS
Pt is a 63 year old Male with PMHx of unprovoked PE (4-5 years ago, on Eliquis) presenting with sharp crushing nonradiating substernal chest pain that began around 10:30AM this morning 4/18 while sitting at his desk working. He tried laying down and repositioning with no relief. States the pain was associated with nausea and diaphoresis. Pain did not radiate and patient denies episodes of vomiting. Patient states that he has never experienced pain like this before and prompted him to present to the hospital. In the ER, EKG showed VEDA in inferior leads with STD in precordial leads and upright T waves concerning for posterior wall STEMI. HR was felecia to 50s but hemodynamically stable. Pt was loaded with ASA 324mg, Brilinta 180mg, and Heparin 4000u and was taken emergently to the cath lab.

## 2023-04-18 NOTE — H&P ADULT - ATTENDING COMMENTS
STEMI s/p PCI with JEWEL to RCA  - educated on the importance of DAPT   - Ace-inhibitor not initiated yet  - Beta-blocker initiated  - patient is on Lipitor 80 mg daily  - trend cardiac enzymes  - TTE prior to discharge

## 2023-04-18 NOTE — PROGRESS NOTE ADULT - SUBJECTIVE AND OBJECTIVE BOX
JOSE MOLINA  MRN-[id number]  Patient is a 63y old  Male who presents with a chief complaint of STEMI (18 Apr 2023 12:47)    HPI:  Pt is a 63 year old Male with PMHx of unprovoked PE (4-5 years ago, on Eliquis) presenting with sharp crushing nonradiating substernal chest pain that began around 10:30AM this morning 4/18 while sitting at his desk working. He tried laying down and repositioning with no relief. States the pain was associated with nausea and diaphoresis. Pain did not radiate and patient denies episodes of vomiting. Patient states that he has never experienced pain like this before and prompted him to present to the hospital. In the ER, EKG showed VEDA in inferior leads with STD in precordial leads and upright T waves concerning for posterior wall STEMI. HR was felecia to 50s but hemodynamically stable. Pt was loaded with ASA 324mg, Brilinta 180mg, and Heparin 4000u and was taken emergently to the cath lab.  (18 Apr 2023 12:47)      Hospital Course:    24 HOUR EVENTS:    REVIEW OF SYSTEMS:    CONSTITUTIONAL: No weakness, fevers or chills  EYES/ENT: No visual changes;  No vertigo or throat pain   NECK: No pain or stiffness  RESPIRATORY: No cough, wheezing, hemoptysis; No shortness of breath  CARDIOVASCULAR: No chest pain or palpitations  GASTROINTESTINAL: No abdominal or epigastric pain. No nausea, vomiting, or hematemesis; No diarrhea or constipation. No melena or hematochezia.  GENITOURINARY: No dysuria, frequency or hematuria  NEUROLOGICAL: No numbness or weakness  SKIN: No itching, rashes      ICU Vital Signs Last 24 Hrs  T(C): 36.3 (18 Apr 2023 19:00), Max: 36.6 (18 Apr 2023 11:00)  T(F): 97.4 (18 Apr 2023 19:00), Max: 97.9 (18 Apr 2023 11:00)  HR: 59 (18 Apr 2023 19:00) (49 - 79)  BP: 117/69 (18 Apr 2023 19:00) (100/59 - 164/112)  BP(mean): 85 (18 Apr 2023 19:00) (76 - 98)  ABP: --  ABP(mean): --  RR: 32 (18 Apr 2023 19:00) (19 - 32)  SpO2: 95% (18 Apr 2023 19:00) (94% - 100%)    O2 Parameters below as of 18 Apr 2023 19:00  Patient On (Oxygen Delivery Method): room air            CVP(mm Hg): --  CO: --  CI: --  PA: --  PA(mean): --  PA(direct): --  PCWP: --  LA: --  RA: --  SVR: --  SVRI: --  PVR: --  PVRI: --  I&O's Summary    18 Apr 2023 07:01  -  18 Apr 2023 19:57  --------------------------------------------------------  IN: 240 mL / OUT: 800 mL / NET: -560 mL        CAPILLARY BLOOD GLUCOSE    CAPILLARY BLOOD GLUCOSE          PHYSICAL EXAM:  GENERAL: No acute distress, well-developed  HEAD:  Atraumatic, Normocephalic  EYES: EOMI, PERRLA, conjunctiva and sclera clear  NECK: Supple, no lymphadenopathy, no JVD  CHEST/LUNG: CTAB; No wheezes, rales, or rhonchi  HEART: Regular rate and rhythm. Normal S1/S2. No murmurs, rubs, or gallops  ABDOMEN: Soft, non-tender, non-distended; normal bowel sounds, no organomegaly  EXTREMITIES:  2+ peripheral pulses b/l, No clubbing, cyanosis, or edema  NEUROLOGY: A&O x 3, no focal deficits  SKIN: No rashes or lesions    ============================I/O===========================   I&O's Detail    18 Apr 2023 07:01  -  18 Apr 2023 19:57  --------------------------------------------------------  IN:    Oral Fluid: 240 mL  Total IN: 240 mL    OUT:    Voided (mL): 800 mL  Total OUT: 800 mL    Total NET: -560 mL        ============================ LABS =========================                        14.6   9.87  )-----------( 169      ( 18 Apr 2023 18:31 )             43.9     04-18    135  |  100  |  18  ----------------------------<  112<H>  4.0   |  22  |  1.15    Ca    8.5      18 Apr 2023 18:27  Phos  2.9     04-18  Mg     1.9     04-18    TPro  6.1  /  Alb  3.6  /  TBili  0.4  /  DBili  x   /  AST  172<H>  /  ALT  33  /  AlkPhos  87  04-18    Troponin T, High Sensitivity Result: 5285 ng/L (04-18-23 @ 18:27)  Troponin T, High Sensitivity Result: 22 ng/L (04-18-23 @ 11:27)    CKMB Units: 92.4 ng/mL (04-18-23 @ 18:27)    Creatine Kinase, Serum: 1505 U/L (04-18-23 @ 18:27)    CPK Mass Assay %: 6.1 % (04-18-23 @ 18:27)        LIVER FUNCTIONS - ( 18 Apr 2023 18:27 )  Alb: 3.6 g/dL / Pro: 6.1 g/dL / ALK PHOS: 87 U/L / ALT: 33 U/L / AST: 172 U/L / GGT: x           PT/INR - ( 18 Apr 2023 11:27 )   PT: 11.1 sec;   INR: 0.97 ratio         PTT - ( 18 Apr 2023 11:27 )  PTT:27.8 sec    Blood Gas Venous - Lactate: 1.9 mmol/L (04-18-23 @ 11:27)      ======================Micro/Rad/Cardio=================  Telemtry: Reviewed   EKG: Reviewed  CXR: Reviewed  Culture: Reviewed   Echo:   Cath:   ======================================================  PAST MEDICAL & SURGICAL HISTORY:  PE (pulmonary thromboembolism)  2019 on Eliquis      COVID-19 virus infection  12/2021 with mild symptoms      Cyst, baker's knee, right      H/O bilateral hip replacements  Left 2012, right 12/2021      S/P lumbar laminectomy  2010        ====================ASSESSMENT ==============  63M w/ PMHx of PE (on Eliquis) presenting with acute chest pain, EKG in ED concerning for posterior STEMI, admitted to CICU s/p emergent cath with mRCA 100% s/p JEWEL x1 + IC integrilin.     Plan:  ====================== NEUROLOGY=====================  A&Ox3  - no acute issues  - continue to monitor mental status as per protocol     ==================== RESPIRATORY======================  Hx of PE  - on home eliquis  - follows with Dr. Haro (Heme/Onc)    On room air  - satting well  - continue to monitor SpO2 with goal >94%    ====================CARDIOVASCULAR==================  Posterior STEMI   - 4/18 RRA PCI: mRCA 100% s/p JEWEL x1 + IC integrilin.   - EF 55%, EDP 33  - Loaded with ASA 325mg and Brilinta 180mg in the ER, continue with maintenance doses starting 4/19  - Given Heparin 4000u in the ER  - trend CE  - Follow up TTE    ===================HEMATOLOGIC/ONC ===================  H/H and platelets stable  - Monitor H/H and plts    DVT PPX: Heparin    ===================== RENAL =========================  BUN and Cr WNL  - Continue monitoring urine output, lytes, SCr/ BUN  - replete lytes prn with goal K >4 and Mg >2    ==================== GASTROINTESTINAL===================  DASH diet    =======================    ENDOCRINE  =====================  Follow up A1c, TSH, Lipid panel     ========================INFECTIOUS DISEASE================  Afebrile, no leukocytosis  - monitor and trend WBC and temperature curve         Patient requires continuous monitoring with bedside rhythm monitoring, pulse ox monitoring, and intermittent blood gas analysis. Care plan discussed with ICU care team. Patient remained critical and at risk for life threatening decompensation.  Patient seen, examined and plan discussed with CCU team during rounds.     I have personally provided ____ minutes of critical care time excluding time spent on separate procedures, in addition to initial critical care time provided by the CICU Attending,     By signing my name below, I, Bia Self, attest that this documentation has been prepared under the direction and in the presence of RAY Vergara.  Electronically signed: Yinka Renteria, 04-18-23 @ 19:57    I, Sharon Lord, personally performed the services described in this documentation. all medical record entries made by the edenibchet were at my direction and in my presence. I have reviewed the chart and agree that the record reflects my personal performance and is accurate and complete  Electronically signed: RAY Vergara.       JOSE MOLINA  MRN-[id number]  Patient is a 63y old  Male who presents with a chief complaint of STEMI (18 Apr 2023 12:47)    HPI:  Pt is a 63 year old Male with PMHx of unprovoked PE (4-5 years ago, on Eliquis) presenting with sharp crushing nonradiating substernal chest pain that began around 10:30AM this morning 4/18 while sitting at his desk working. He tried laying down and repositioning with no relief. States the pain was associated with nausea and diaphoresis. Pain did not radiate and patient denies episodes of vomiting. Patient states that he has never experienced pain like this before and prompted him to present to the hospital. In the ER, EKG showed VEDA in inferior leads with STD in precordial leads and upright T waves concerning for posterior wall STEMI. HR was felecia to 50s but hemodynamically stable. Pt was loaded with ASA 324mg, Brilinta 180mg, and Heparin 4000u and was taken emergently to the cath lab.  (18 Apr 2023 12:47)    24 HOUR EVENTS: admitted to CCU    REVIEW OF SYSTEMS:    CONSTITUTIONAL: No weakness, fevers or chills  EYES/ENT: No visual changes;  No vertigo or throat pain   NECK: No pain or stiffness  RESPIRATORY: No cough, wheezing, hemoptysis; No shortness of breath  CARDIOVASCULAR: No chest pain or palpitations  GASTROINTESTINAL: No abdominal or epigastric pain. No nausea, vomiting, or hematemesis; No diarrhea or constipation. No melena or hematochezia.  GENITOURINARY: No dysuria, frequency or hematuria  NEUROLOGICAL: No numbness or weakness  SKIN: No itching, rashes      ICU Vital Signs Last 24 Hrs  T(C): 36.3 (18 Apr 2023 19:00), Max: 36.6 (18 Apr 2023 11:00)  T(F): 97.4 (18 Apr 2023 19:00), Max: 97.9 (18 Apr 2023 11:00)  HR: 59 (18 Apr 2023 19:00) (49 - 79)  BP: 117/69 (18 Apr 2023 19:00) (100/59 - 164/112)  BP(mean): 85 (18 Apr 2023 19:00) (76 - 98)  RR: 32 (18 Apr 2023 19:00) (19 - 32)  SpO2: 95% (18 Apr 2023 19:00) (94% - 100%)    O2 Parameters below as of 18 Apr 2023 19:00  Patient On (Oxygen Delivery Method): room air      I&O's Summary    18 Apr 2023 07:01  -  18 Apr 2023 19:57  --------------------------------------------------------  IN: 240 mL / OUT: 800 mL / NET: -560 mL        CAPILLARY BLOOD GLUCOSE    CAPILLARY BLOOD GLUCOSE          ============================I/O===========================   I&O's Detail    18 Apr 2023 07:01  -  18 Apr 2023 19:57  --------------------------------------------------------  IN:    Oral Fluid: 240 mL  Total IN: 240 mL    OUT:    Voided (mL): 800 mL  Total OUT: 800 mL    Total NET: -560 mL        ============================ LABS =========================                        14.6   9.87  )-----------( 169      ( 18 Apr 2023 18:31 )             43.9     04-18    135  |  100  |  18  ----------------------------<  112<H>  4.0   |  22  |  1.15    Ca    8.5      18 Apr 2023 18:27  Phos  2.9     04-18  Mg     1.9     04-18    TPro  6.1  /  Alb  3.6  /  TBili  0.4  /  DBili  x   /  AST  172<H>  /  ALT  33  /  AlkPhos  87  04-18    Troponin T, High Sensitivity Result: 5285 ng/L (04-18-23 @ 18:27)  Troponin T, High Sensitivity Result: 22 ng/L (04-18-23 @ 11:27)    CKMB Units: 92.4 ng/mL (04-18-23 @ 18:27)    Creatine Kinase, Serum: 1505 U/L (04-18-23 @ 18:27)    CPK Mass Assay %: 6.1 % (04-18-23 @ 18:27)        LIVER FUNCTIONS - ( 18 Apr 2023 18:27 )  Alb: 3.6 g/dL / Pro: 6.1 g/dL / ALK PHOS: 87 U/L / ALT: 33 U/L / AST: 172 U/L / GGT: x           PT/INR - ( 18 Apr 2023 11:27 )   PT: 11.1 sec;   INR: 0.97 ratio         PTT - ( 18 Apr 2023 11:27 )  PTT:27.8 sec    Blood Gas Venous - Lactate: 1.9 mmol/L (04-18-23 @ 11:27)      ======================Micro/Rad/Cardio=================  Telemtry: Reviewed   EKG: Reviewed  CXR: Reviewed  Culture: Reviewed   Echo:   Cath:   ======================================================  PAST MEDICAL & SURGICAL HISTORY:  PE (pulmonary thromboembolism)  2019 on Eliquis      COVID-19 virus infection  12/2021 with mild symptoms      Cyst, baker's knee, right      H/O bilateral hip replacements  Left 2012, right 12/2021      S/P lumbar laminectomy  2010        ====================ASSESSMENT ==============  63M w/ PMHx of PE (on Eliquis) presenting with acute chest pain, EKG in ED concerning for posterior STEMI, admitted to CICU s/p emergent cath with mRCA 100% s/p JEWEL x1 + IC integrilin.     Plan:  ====================== NEUROLOGY=====================  A&Ox3  - no acute issues  - continue to monitor mental status as per protocol     ==================== RESPIRATORY======================  Hx of PE  - on home eliquis  - follows with Dr. Haro (Heme/Onc)  - will be on triple therapy while in hospital, will d/c on plavix and eliquis    On room air  - satting well  - continue to monitor SpO2 with goal >94%    ====================CARDIOVASCULAR==================  Posterior STEMI   - 4/18 RRA PCI: mRCA 100% s/p JEWEL x1 + IC integrilin.   - EF 55%, EDP 33  - Loaded with ASA 325mg and Brilinta 180mg in the ER  - given pt on eliquis, will reload with Plavix in AM for appropriate therapy  - trend CE  - TTE: pending   - will add BB when able, pt HR low 60s while awake     ===================HEMATOLOGIC/ONC ===================  H/H and platelets stable  - Monitor H/H and plts    ===================== RENAL =========================  BUN and Cr WNL  - Continue monitoring urine output, lytes, SCr/ BUN  - replete lytes prn with goal K >4 and Mg >2    ==================== GASTROINTESTINAL===================  DASH diet    =======================    ENDOCRINE  =====================  Follow up A1c, TSH, Lipid panel     ========================INFECTIOUS DISEASE================  Afebrile, no leukocytosis  - monitor and trend WBC and temperature curve         Patient requires continuous monitoring with bedside rhythm monitoring, pulse ox monitoring, and intermittent blood gas analysis. Care plan discussed with ICU care team. Patient remained critical and at risk for life threatening decompensation.  Patient seen, examined and plan discussed with CCU team during rounds.     I have personally provided 35 minutes of critical care time excluding time spent on separate procedures, in addition to initial critical care time provided by the CICU Attending,     By signing my name below, I, Bia Self, attest that this documentation has been prepared under the direction and in the presence of RAY Vergara.  Electronically signed: Yinka Renteria, 04-18-23 @ 19:57    I, Sharon Lord, personally performed the services described in this documentation. all medical record entries made by the edenib[facility] were at my direction and in my presence. I have reviewed the chart and agree that the record reflects my personal performance and is accurate and complete  Electronically signed: RAY Vergara.

## 2023-04-18 NOTE — H&P ADULT - ASSESSMENT
====================ASSESSMENT ==============  63M w/ PMHx of PE (on Eliquis) presenting with acute chest pain, EKG in ED concerning for posterior STEMI, admitted to CICU s/p emergent cath with mRCA 100% s/p JEWEL x1 + IC integrilin.     Plan:  ====================== NEUROLOGY=====================  A&Ox3  - no acute issues  - continue to monitor mental status as per protocol     ==================== RESPIRATORY======================  Hx of PE  - on home eliquis  - follows with Dr. Haro (Heme/Onc)    On room air  - satting well  - continue to monitor SpO2 with goal >94%    ====================CARDIOVASCULAR==================  Posterior STEMI   - 4/18 RRA PCI: mRCA 100% s/p JEWEL x1 + IC integrilin.   - EF 55%, EDP 33  - Loaded with ASA 325mg and Brilinta 180mg in the ER, continue with maintenance doses starting 4/19  - Given Heparin 4000u in the ER  - trend CE  - Follow up TTE    ===================HEMATOLOGIC/ONC ===================  H/H and platelets stable  - Monitor H/H and plts    DVT PPX: Heparin    ===================== RENAL =========================  BUN and Cr WNL  - Continue monitoring urine output, lytes, SCr/ BUN  - replete lytes prn with goal K >4 and Mg >2    ==================== GASTROINTESTINAL===================  DASH diet    =======================    ENDOCRINE  =====================  Follow up A1c, TSH, Lipid panel     ========================INFECTIOUS DISEASE================  Afebrile, no leukocytosis  - monitor and trend WBC and temperature curve         Patient requires continuous monitoring with bedside rhythm monitoring, arterial line, pulse oximetry, ventilator monitoring and intermittent blood gas analysis.  Care plan discussed with ICU care team.  Patient remained critical; required more than usual post op care; I have spent 35 minutes providing non-routine post op care, in addition to initial critical time provided by CICU attending Dr. Justice, re-evaluated multiple times during the day.

## 2023-04-19 ENCOUNTER — TRANSCRIPTION ENCOUNTER (OUTPATIENT)
Age: 63
End: 2023-04-19

## 2023-04-19 VITALS — HEART RATE: 73 BPM

## 2023-04-19 LAB
A1C WITH ESTIMATED AVERAGE GLUCOSE RESULT: 5.8 % — HIGH (ref 4–5.6)
ALBUMIN SERPL ELPH-MCNC: 3.3 G/DL — SIGNIFICANT CHANGE UP (ref 3.3–5)
ALP SERPL-CCNC: 80 U/L — SIGNIFICANT CHANGE UP (ref 40–120)
ALT FLD-CCNC: 30 U/L — SIGNIFICANT CHANGE UP (ref 10–45)
ANION GAP SERPL CALC-SCNC: 10 MMOL/L — SIGNIFICANT CHANGE UP (ref 5–17)
APTT BLD: 26.6 SEC — LOW (ref 27.5–35.5)
AST SERPL-CCNC: 144 U/L — HIGH (ref 10–40)
BASOPHILS # BLD AUTO: 0.03 K/UL — SIGNIFICANT CHANGE UP (ref 0–0.2)
BASOPHILS NFR BLD AUTO: 0.3 % — SIGNIFICANT CHANGE UP (ref 0–2)
BILIRUB SERPL-MCNC: 0.5 MG/DL — SIGNIFICANT CHANGE UP (ref 0.2–1.2)
BUN SERPL-MCNC: 16 MG/DL — SIGNIFICANT CHANGE UP (ref 7–23)
CALCIUM SERPL-MCNC: 7.8 MG/DL — LOW (ref 8.4–10.5)
CHLORIDE SERPL-SCNC: 101 MMOL/L — SIGNIFICANT CHANGE UP (ref 96–108)
CHOLEST SERPL-MCNC: 208 MG/DL — HIGH
CK MB BLD-MCNC: 4.4 % — HIGH (ref 0–3.5)
CK MB CFR SERPL CALC: 48.6 NG/ML — HIGH (ref 0–6.7)
CK SERPL-CCNC: 1110 U/L — HIGH (ref 30–200)
CO2 SERPL-SCNC: 23 MMOL/L — SIGNIFICANT CHANGE UP (ref 22–31)
CREAT SERPL-MCNC: 1.08 MG/DL — SIGNIFICANT CHANGE UP (ref 0.5–1.3)
EGFR: 78 ML/MIN/1.73M2 — SIGNIFICANT CHANGE UP
EOSINOPHIL # BLD AUTO: 0.09 K/UL — SIGNIFICANT CHANGE UP (ref 0–0.5)
EOSINOPHIL NFR BLD AUTO: 1 % — SIGNIFICANT CHANGE UP (ref 0–6)
ESTIMATED AVERAGE GLUCOSE: 120 MG/DL — HIGH (ref 68–114)
GLUCOSE SERPL-MCNC: 185 MG/DL — HIGH (ref 70–99)
HCT VFR BLD CALC: 41.9 % — SIGNIFICANT CHANGE UP (ref 39–50)
HDLC SERPL-MCNC: 40 MG/DL — LOW
HGB BLD-MCNC: 14.1 G/DL — SIGNIFICANT CHANGE UP (ref 13–17)
IMM GRANULOCYTES NFR BLD AUTO: 0.3 % — SIGNIFICANT CHANGE UP (ref 0–0.9)
INR BLD: 1.09 RATIO — SIGNIFICANT CHANGE UP (ref 0.88–1.16)
LIPID PNL WITH DIRECT LDL SERPL: 144 MG/DL — HIGH
LYMPHOCYTES # BLD AUTO: 1.14 K/UL — SIGNIFICANT CHANGE UP (ref 1–3.3)
LYMPHOCYTES # BLD AUTO: 12.3 % — LOW (ref 13–44)
MAGNESIUM SERPL-MCNC: 5.2 MG/DL — HIGH (ref 1.6–2.6)
MCHC RBC-ENTMCNC: 30.5 PG — SIGNIFICANT CHANGE UP (ref 27–34)
MCHC RBC-ENTMCNC: 33.7 GM/DL — SIGNIFICANT CHANGE UP (ref 32–36)
MCV RBC AUTO: 90.7 FL — SIGNIFICANT CHANGE UP (ref 80–100)
MONOCYTES # BLD AUTO: 0.93 K/UL — HIGH (ref 0–0.9)
MONOCYTES NFR BLD AUTO: 10.1 % — SIGNIFICANT CHANGE UP (ref 2–14)
NEUTROPHILS # BLD AUTO: 7.03 K/UL — SIGNIFICANT CHANGE UP (ref 1.8–7.4)
NEUTROPHILS NFR BLD AUTO: 76 % — SIGNIFICANT CHANGE UP (ref 43–77)
NON HDL CHOLESTEROL: 168 MG/DL — HIGH
NRBC # BLD: 0 /100 WBCS — SIGNIFICANT CHANGE UP (ref 0–0)
PHOSPHATE SERPL-MCNC: 2.6 MG/DL — SIGNIFICANT CHANGE UP (ref 2.5–4.5)
PLATELET # BLD AUTO: 142 K/UL — LOW (ref 150–400)
POTASSIUM SERPL-MCNC: 4 MMOL/L — SIGNIFICANT CHANGE UP (ref 3.5–5.3)
POTASSIUM SERPL-SCNC: 4 MMOL/L — SIGNIFICANT CHANGE UP (ref 3.5–5.3)
PROT SERPL-MCNC: 5.6 G/DL — LOW (ref 6–8.3)
PROTHROM AB SERPL-ACNC: 12.6 SEC — SIGNIFICANT CHANGE UP (ref 10.5–13.4)
RBC # BLD: 4.62 M/UL — SIGNIFICANT CHANGE UP (ref 4.2–5.8)
RBC # FLD: 13.2 % — SIGNIFICANT CHANGE UP (ref 10.3–14.5)
SODIUM SERPL-SCNC: 134 MMOL/L — LOW (ref 135–145)
TRIGL SERPL-MCNC: 120 MG/DL — SIGNIFICANT CHANGE UP
TROPONIN T, HIGH SENSITIVITY RESULT: 5028 NG/L — HIGH (ref 0–51)
TSH SERPL-MCNC: 1.45 UIU/ML — SIGNIFICANT CHANGE UP (ref 0.27–4.2)
WBC # BLD: 9.25 K/UL — SIGNIFICANT CHANGE UP (ref 3.8–10.5)
WBC # FLD AUTO: 9.25 K/UL — SIGNIFICANT CHANGE UP (ref 3.8–10.5)

## 2023-04-19 PROCEDURE — 84484 ASSAY OF TROPONIN QUANT: CPT

## 2023-04-19 PROCEDURE — 82803 BLOOD GASES ANY COMBINATION: CPT

## 2023-04-19 PROCEDURE — 82565 ASSAY OF CREATININE: CPT

## 2023-04-19 PROCEDURE — C8929: CPT

## 2023-04-19 PROCEDURE — 96375 TX/PRO/DX INJ NEW DRUG ADDON: CPT

## 2023-04-19 PROCEDURE — 85018 HEMOGLOBIN: CPT

## 2023-04-19 PROCEDURE — 82553 CREATINE MB FRACTION: CPT

## 2023-04-19 PROCEDURE — 84132 ASSAY OF SERUM POTASSIUM: CPT

## 2023-04-19 PROCEDURE — 93458 L HRT ARTERY/VENTRICLE ANGIO: CPT | Mod: 59

## 2023-04-19 PROCEDURE — 93010 ELECTROCARDIOGRAM REPORT: CPT | Mod: 76

## 2023-04-19 PROCEDURE — 82550 ASSAY OF CK (CPK): CPT

## 2023-04-19 PROCEDURE — 83735 ASSAY OF MAGNESIUM: CPT

## 2023-04-19 PROCEDURE — 80061 LIPID PANEL: CPT

## 2023-04-19 PROCEDURE — 84295 ASSAY OF SERUM SODIUM: CPT

## 2023-04-19 PROCEDURE — 82947 ASSAY GLUCOSE BLOOD QUANT: CPT

## 2023-04-19 PROCEDURE — 84443 ASSAY THYROID STIM HORMONE: CPT

## 2023-04-19 PROCEDURE — 82330 ASSAY OF CALCIUM: CPT

## 2023-04-19 PROCEDURE — ZZZZZ: CPT

## 2023-04-19 PROCEDURE — 99233 SBSQ HOSP IP/OBS HIGH 50: CPT | Mod: GC

## 2023-04-19 PROCEDURE — 83690 ASSAY OF LIPASE: CPT

## 2023-04-19 PROCEDURE — 83036 HEMOGLOBIN GLYCOSYLATED A1C: CPT

## 2023-04-19 PROCEDURE — 83605 ASSAY OF LACTIC ACID: CPT

## 2023-04-19 PROCEDURE — 85610 PROTHROMBIN TIME: CPT

## 2023-04-19 PROCEDURE — 85730 THROMBOPLASTIN TIME PARTIAL: CPT

## 2023-04-19 PROCEDURE — 80053 COMPREHEN METABOLIC PANEL: CPT

## 2023-04-19 PROCEDURE — 86850 RBC ANTIBODY SCREEN: CPT

## 2023-04-19 PROCEDURE — 82435 ASSAY OF BLOOD CHLORIDE: CPT

## 2023-04-19 PROCEDURE — 93308 TTE F-UP OR LMTD: CPT

## 2023-04-19 PROCEDURE — C1874: CPT

## 2023-04-19 PROCEDURE — 85520 HEPARIN ASSAY: CPT

## 2023-04-19 PROCEDURE — C1894: CPT

## 2023-04-19 PROCEDURE — 93567 NJX CAR CTH SPRVLV AORTGRPHY: CPT

## 2023-04-19 PROCEDURE — C1725: CPT

## 2023-04-19 PROCEDURE — 86901 BLOOD TYPING SEROLOGIC RH(D): CPT

## 2023-04-19 PROCEDURE — 85014 HEMATOCRIT: CPT

## 2023-04-19 PROCEDURE — 84100 ASSAY OF PHOSPHORUS: CPT

## 2023-04-19 PROCEDURE — 96374 THER/PROPH/DIAG INJ IV PUSH: CPT

## 2023-04-19 PROCEDURE — U0003: CPT

## 2023-04-19 PROCEDURE — 93005 ELECTROCARDIOGRAM TRACING: CPT

## 2023-04-19 PROCEDURE — 85025 COMPLETE CBC W/AUTO DIFF WBC: CPT

## 2023-04-19 PROCEDURE — 92978 ENDOLUMINL IVUS OCT C 1ST: CPT | Mod: RC

## 2023-04-19 PROCEDURE — C1769: CPT

## 2023-04-19 PROCEDURE — 86900 BLOOD TYPING SEROLOGIC ABO: CPT

## 2023-04-19 PROCEDURE — 71045 X-RAY EXAM CHEST 1 VIEW: CPT

## 2023-04-19 PROCEDURE — 83880 ASSAY OF NATRIURETIC PEPTIDE: CPT

## 2023-04-19 PROCEDURE — C9606: CPT | Mod: RC

## 2023-04-19 PROCEDURE — C1887: CPT

## 2023-04-19 PROCEDURE — 93306 TTE W/DOPPLER COMPLETE: CPT | Mod: 26

## 2023-04-19 PROCEDURE — 99285 EMERGENCY DEPT VISIT HI MDM: CPT | Mod: 25

## 2023-04-19 RX ORDER — CLOPIDOGREL BISULFATE 75 MG/1
1 TABLET, FILM COATED ORAL
Qty: 30 | Refills: 3
Start: 2023-04-19

## 2023-04-19 RX ORDER — APIXABAN 2.5 MG/1
1 TABLET, FILM COATED ORAL
Qty: 60 | Refills: 3
Start: 2023-04-19

## 2023-04-19 RX ORDER — CLOPIDOGREL BISULFATE 75 MG/1
1 TABLET, FILM COATED ORAL
Qty: 0 | Refills: 0 | DISCHARGE
Start: 2023-04-19

## 2023-04-19 RX ORDER — ATORVASTATIN CALCIUM 80 MG/1
1 TABLET, FILM COATED ORAL
Qty: 0 | Refills: 0 | DISCHARGE
Start: 2023-04-19

## 2023-04-19 RX ORDER — APIXABAN 2.5 MG/1
1 TABLET, FILM COATED ORAL
Refills: 0 | DISCHARGE

## 2023-04-19 RX ORDER — ATORVASTATIN CALCIUM 80 MG/1
1 TABLET, FILM COATED ORAL
Qty: 30 | Refills: 3
Start: 2023-04-19

## 2023-04-19 RX ORDER — METOPROLOL TARTRATE 50 MG
0.5 TABLET ORAL
Qty: 30 | Refills: 3
Start: 2023-04-19

## 2023-04-19 RX ORDER — METOPROLOL TARTRATE 50 MG
12.5 TABLET ORAL
Refills: 0 | Status: DISCONTINUED | OUTPATIENT
Start: 2023-04-19 | End: 2023-04-19

## 2023-04-19 RX ORDER — APIXABAN 2.5 MG/1
1 TABLET, FILM COATED ORAL
Qty: 60 | Refills: 0
Start: 2023-04-19

## 2023-04-19 RX ORDER — METOPROLOL TARTRATE 50 MG
12.5 TABLET ORAL
Qty: 0 | Refills: 0 | DISCHARGE
Start: 2023-04-19

## 2023-04-19 RX ADMIN — CLOPIDOGREL BISULFATE 600 MILLIGRAM(S): 75 TABLET, FILM COATED ORAL at 05:36

## 2023-04-19 RX ADMIN — APIXABAN 2.5 MILLIGRAM(S): 2.5 TABLET, FILM COATED ORAL at 10:17

## 2023-04-19 RX ADMIN — Medication 12.5 MILLIGRAM(S): at 11:01

## 2023-04-19 RX ADMIN — Medication 81 MILLIGRAM(S): at 11:01

## 2023-04-19 NOTE — CONSULT NOTE ADULT - SUBJECTIVE AND OBJECTIVE BOX
Registration Time: 11:00  Initial EC:01  Called by ED: 11:07  Saw patient at bedside: 11:10  Called Cath Attendin:15  Balloon/device time: 11:45    Patient seen and evaluated at bedside    Reason for consult: STEMI    HPI:  Mr. Ngo is a 63yoM.   PMH of PE    Patient was home working at his desk when he developed severe onset crushing CP.   His wife immediately brought him to the hospital, where he was noted to have and ECG concerning for Posterior wall STEMI.   Denies prior CP. No allergies. Otherwise feels well.   Patient loaded with ASA/Brilinta/Heparin and taken emergently to cath lab. HDS throughout encounter.     PMHx:   No pertinent past medical history    PE (pulmonary thromboembolism)    COVID-19 virus infection    Cyst, baker's knee, right        PSHx:   No significant past surgical history    H/O bilateral hip replacements    S/P lumbar laminectomy        Home Meds:    Current Meds:   heparin   Injectable 4000 Unit(s) IV Push every 6 hours PRN  heparin  Infusion.  Unit(s)/Hr IV Continuous <Continuous>      Allergies:  No Known Allergies      FAMILY HISTORY:  Family history of stroke (Mother)          REVIEW OF SYSTEMS:  CONSTITUTIONAL: No weakness, fevers or chills  EYES/ENT: No visual changes;  No dysphagia  NECK: No pain or stiffness  RESPIRATORY: No cough, wheezing, hemoptysis; No shortness of breath  CARDIOVASCULAR: +chest pain   GASTROINTESTINAL: No abdominal or epigastric pain. No nausea, vomiting  BACK: No back pain  GENITOURINARY: No dysuria, frequency or hematuria  NEUROLOGICAL: No numbness or weakness  SKIN: No itching, burning, rashes, or lesions   All other review of systems is negative unless indicated above.    Physical Exam:  T(F): 97.9 (-18), Max: 97.9 (-18)  HR: 61 (-18) (55 - 61)  BP: 146/97 (-18) (146/97 - 164/112)  RR: 20 (-18)  SpO2: 100% (-18)  GENERAL: No acute distress, well-developed  HEAD:  Atraumatic, Normocephalic  ENT: EOMI, PERRLA, conjunctiva and sclera clear  CHEST/LUNG: Clear to auscultation bilaterally  BACK: No spinal tenderness  HEART: Regular rate and rhythm; No murmur  ABDOMEN: Soft, Nontender, Nondistended; Bowel sounds present  EXTREMITIES:  No clubbing, cyanosis, or edema  PSYCH: Nl behavior, nl affect  NEUROLOGY: AAOx3, non-focal, cranial nerves intact  SKIN: Normal color, No rashes or lesions      ECG: Personally reviewed      Labs: Personally reviewed                        16.2   7.51  )-----------( 188      ( 2023 11:27 )             50.0                       
HPI:     63yoM with HX of PE on low dose Eliquis s/p posterior wall MI w JEWEL.     2019 pt presented to [facility] w chest pain found to have bilateral PE and Above and below the knee right lower extremity DVT. Was treated with Eliquis. Never smoker. No hx of cancer (screening up to date). No family hx of clotting. No previous hypercoagulable workup. Now on Eliquis 2.5 mg BID.  No current complaints.       Allergies    No Known Allergies      	    MEDICATIONS:  apixaban 2.5 milliGRAM(s) Oral every 12 hours  aspirin  chewable 81 milliGRAM(s) Oral daily  metoprolol tartrate 12.5 milliGRAM(s) Oral two times a day  atorvastatin 80 milliGRAM(s) Oral at bedtime  chlorhexidine 2% Cloths 1 Application(s) Topical <User Schedule>      PAST MEDICAL & SURGICAL HISTORY:  PE (pulmonary thromboembolism)  2019 on Eliquis  COVID-19 virus infection  12/2021 with mild symptoms  Cyst, baker's knee, right  H/O bilateral hip replacements  Left 2012, right 12/2021  S/P lumbar laminectomy  2010      FAMILY HISTORY:  Family history of stroke (Mother)        SOCIAL HISTORY:  never smoker     REVIEW OF SYSTEMS:  12 point review of system completed negative unless stated otherwise   [ x] All others negative	  [ ] Unable to obtain    PHYSICAL EXAM:  T(C): 37.1 (04-19-23 @ 12:00), Max: 37.1 (04-19-23 @ 12:00)  HR: 63 (04-19-23 @ 13:00) (53 - 79)  BP: 99/69 (04-19-23 @ 12:00) (99/69 - 137/86)  RR: 18 (04-19-23 @ 13:00) (8 - 32)  SpO2: 94% (04-19-23 @ 13:00) (92% - 96%)  Wt(kg): --  I&O's Summary    18 Apr 2023 07:01  -  19 Apr 2023 07:00  --------------------------------------------------------  IN: 360 mL / OUT: 2050 mL / NET: -1690 mL    19 Apr 2023 07:01  -  19 Apr 2023 13:18  --------------------------------------------------------  IN: 0 mL / OUT: 225 mL / NET: -225 mL        Appearance:  	  HEENT:   Normal oral mucosa, PERRL, EOMI	  Carotid: No bruit   Lymphatic: No lymphadenopathy  Cardiovascular:  RRR  Respiratory:  	CTAB  Psychiatry:  AAO x 3  Gastrointestinal:  Soft, Non-tender, + BS	  Skin: No rashes, No ecchymoses, No cyanosis	  Neurologic:  nonfocal  Extremities:  spontaneous movement     Vascular Pulse Exam: pulses palpable     LABS:	 	    CBC Full  -  ( 19 Apr 2023 01:03 )  WBC Count : 9.25 K/uL  Hemoglobin : 14.1 g/dL  Hematocrit : 41.9 %  Platelet Count - Automated : 142 K/uL  Mean Cell Volume : 90.7 fl  Mean Cell Hemoglobin : 30.5 pg  Mean Cell Hemoglobin Concentration : 33.7 gm/dL  Auto Neutrophil # : 7.03 K/uL  Auto Lymphocyte # : 1.14 K/uL  Auto Monocyte # : 0.93 K/uL  Auto Eosinophil # : 0.09 K/uL  Auto Basophil # : 0.03 K/uL  Auto Neutrophil % : 76.0 %  Auto Lymphocyte % : 12.3 %  Auto Monocyte % : 10.1 %  Auto Eosinophil % : 1.0 %  Auto Basophil % : 0.3 %    04-19    134<L>  |  101  |  16  ----------------------------<  185<H>  4.0   |  23  |  1.08  04-18    135  |  100  |  18  ----------------------------<  112<H>  4.0   |  22  |  1.15    Ca    7.8<L>      19 Apr 2023 01:03  Ca    8.5      18 Apr 2023 18:27  Phos  2.6     04-19  Phos  2.9     04-18  Mg     5.2     04-19  Mg     1.9     04-18    TPro  5.6<L>  /  Alb  3.3  /  TBili  0.5  /  DBili  x   /  AST  144<H>  /  ALT  30  /  AlkPhos  80  04-19  TPro  6.1  /  Alb  3.6  /  TBili  0.4  /  DBili  x   /  AST  172<H>  /  ALT  33  /  AlkPhos  87  04-18

## 2023-04-19 NOTE — CONSULT NOTE ADULT - ASSESSMENT
Assessment:  1. Inferior wall MI S/P JEWEL  2. HX of unprovoked PE/DVT            Plan:  1. Recc cont Eliquis 2.5 mg BID to be cont indefinitely   2. Recc LE venous duplex for surveillance   3. Hypercoagulable workup to be done as outpatient          Thank you      Vascular Cardiology Service    Please call with any questions:   DIRECT SERVICE NUMBER:  [phone]

## 2023-04-19 NOTE — DISCHARGE NOTE PROVIDER - NSDCCPTREATMENT_GEN_ALL_CORE_FT
PRINCIPAL PROCEDURE  Procedure: Left heart cardiac cath  Findings and Treatment: 4/18 PCI RRA: mRCA 100% s/p JEWEL x1. IC integrilin given. EF 45% on vgram.

## 2023-04-19 NOTE — DISCHARGE NOTE PROVIDER - NSDCHC_MEDRECSTATUS_GEN_ALL_CORE
Admission Reconciliation is Not Complete  Discharge Reconciliation is Not Complete Admission Reconciliation is Not Complete  Discharge Reconciliation is Completed Admission Reconciliation is Completed  Discharge Reconciliation is Not Complete

## 2023-04-19 NOTE — DISCHARGE NOTE NURSING/CASE MANAGEMENT/SOCIAL WORK - PATIENT PORTAL LINK FT
You can access the FollowMyHealth Patient Portal offered by [facility] by registering at the following website: [email or web address]. By joining [facility] FollowMyHealth portal, you will also be able to view your health information using other applications (apps) compatible with our system.

## 2023-04-19 NOTE — PROGRESS NOTE ADULT - SUBJECTIVE AND OBJECTIVE BOX
JOSE MOLINA  MRN-[id number]  Patient is a 63y old  Male who presents with a chief complaint of STEMI (18 Apr 2023 19:56)    HPI:  Pt is a 63 year old Male with PMHx of unprovoked PE (4-5 years ago, on Eliquis) presenting with sharp crushing nonradiating substernal chest pain that began around 10:30AM this morning 4/18 while sitting at his desk working. He tried laying down and repositioning with no relief. States the pain was associated with nausea and diaphoresis. Pain did not radiate and patient denies episodes of vomiting. Patient states that he has never experienced pain like this before and prompted him to present to the hospital. In the ER, EKG showed VEDA in inferior leads with STD in precordial leads and upright T waves concerning for posterior wall STEMI. HR was felecia to 50s but hemodynamically stable. Pt was loaded with ASA 324mg, Brilinta 180mg, and Heparin 4000u and was taken emergently to the cath lab.  (18 Apr 2023 12:47)      24hr Interval History: RRA band removed 5PM 4/18 without complications.       Physical Exam:  Vital Signs Last 24 Hrs  T(C): 36.9 (19 Apr 2023 05:00), Max: 36.9 (19 Apr 2023 05:00)  T(F): 98.4 (19 Apr 2023 05:00), Max: 98.4 (19 Apr 2023 05:00)  HR: 54 (19 Apr 2023 06:00) (49 - 79)  BP: 108/67 (19 Apr 2023 06:00) (100/59 - 164/112)  BP(mean): 81 (19 Apr 2023 06:00) (76 - 108)  RR: 18 (19 Apr 2023 06:00) (8 - 32)  SpO2: 93% (19 Apr 2023 06:00) (92% - 100%)    Parameters below as of 19 Apr 2023 06:00  Patient On (Oxygen Delivery Method): room air        PHYSICAL EXAM:  Constitutional: Patient laying in bed, NAD  Head: Atraumatic,  Eyes: No scleral icterus.   ENMT: Moist mucous membrane.   Neck: Supple  Respiratory: CTA B/L. No wheezes, rales or rhonchi   Cardiovascular: S1/S2. No murmurs, rubs, or gallops.  Gastrointestinal: Nondistended, BSx4, soft, nontender.  Extremities: Moves all extremities. Warm, no edema, nontender  Vascular: 2+ DP/PT pulses B/L. RRA site soft, without ecchymosis.  Neurological: A&Ox3. Follows commands. No focal deficits.   Skin: No rashes on exposed skin     ============================I/O===========================   I&O's Detail    18 Apr 2023 07:01  -  19 Apr 2023 06:48  --------------------------------------------------------  IN:    Oral Fluid: 240 mL  Total IN: 240 mL    OUT:    Voided (mL): 1530 mL  Total OUT: 1530 mL    Total NET: -1290 mL        ============================ LABS =========================                        14.1   9.25  )-----------( 142      ( 19 Apr 2023 01:03 )             41.9     04-19    134<L>  |  101  |  16  ----------------------------<  185<H>  4.0   |  23  |  1.08    Ca    7.8<L>      19 Apr 2023 01:03  Phos  2.6     04-19  Mg     5.2     04-19    TPro  5.6<L>  /  Alb  3.3  /  TBili  0.5  /  DBili  x   /  AST  144<H>  /  ALT  30  /  AlkPhos  80  04-19    LIVER FUNCTIONS - ( 19 Apr 2023 01:03 )  Alb: 3.3 g/dL / Pro: 5.6 g/dL / ALK PHOS: 80 U/L / ALT: 30 U/L / AST: 144 U/L / GGT: x           PT/INR - ( 19 Apr 2023 01:03 )   PT: 12.6 sec;   INR: 1.09 ratio         PTT - ( 19 Apr 2023 01:03 )  PTT:26.6 sec      ======================Micro/Rad/Cardio=================  Culture: Reviewed   CXR: Reviewed  Echo:Reviewed  ======================================================  PAST MEDICAL & SURGICAL HISTORY:  PE (pulmonary thromboembolism)  2019 on Eliquis      COVID-19 virus infection  12/2021 with mild symptoms      Cyst, baker's knee, right      H/O bilateral hip replacements  Left 2012, right 12/2021      S/P lumbar laminectomy  2010        ====================ASSESSMENT ==============  63M w/ PMHx of PE (on Eliquis) presenting with acute chest pain, EKG in ED concerning for posterior STEMI, admitted to CICU s/p emergent cath with mRCA 100% s/p JEWEL x1 + IC integrilin.     Plan:  ====================== NEUROLOGY=====================  A&Ox3  - no acute issues  - continue to monitor mental status as per protocol     ==================== RESPIRATORY======================  Hx of PE  - on home eliquis  - follows with Dr. Haro (Heme/Onc)  - Loaded on Brilinta prior to cath, reloaded on Plavix 4/19 AM to continue eliquis  - will be on triple therapy while in hospital, will d/c on plavix and eliquis    On room air  - satting well- continue to monitor SpO2 with goal >94%    ====================CARDIOVASCULAR==================  Posterior STEMI   - 4/18 RRA PCI: mRCA 100% s/p JEWEL x1 + IC integrilin.   - EF 55%, EDP 33  - Loaded with ASA 325mg and Brilinta 180mg in the ER   - given pt on eliquis, will reload with Plavix in AM for appropriate therapy  - trend CE  - TTE: pending this AM 4/19  - will add BB when able, pt HR low 60s while awake     ===================HEMATOLOGIC/ONC ===================  Monitor H/H and plts  - DVT PPX: Home eliquis    ===================== RENAL =========================  BUN and Cr WNL  - Continue monitoring urine output, lytes, SCr/ BUN  - replete lytes prn with goal K >4 and Mg >2    ==================== GASTROINTESTINAL===================  DASH diet    =======================    ENDOCRINE  =====================  A1c 5.8  Follow up TSH  Lipid panel - Chol 208, Triglyc 120, HDL 40,     ========================INFECTIOUS DISEASE================  Afebrile, no leukocytosis  - monitor and trend WBC and temperature curve         Patient requires continuous monitoring with bedside rhythm monitoring, arterial line, pulse oximetry, ventilator monitoring and intermittent blood gas analysis.  Care plan discussed with ICU care team.  Patient remained critical; required more than usual post op care; I have spent 35 minutes providing non-routine post op care, in addition to initial critical time provided by CICU attending Dr. Justice, re-evaluated multiple times during the day.         JOSE MOLINA  MRN-[id number]  Patient is a 63y old  Male who presents with a chief complaint of STEMI (18 Apr 2023 19:56)    HPI:  Pt is a 63 year old Male with PMHx of unprovoked PE (4-5 years ago, on Eliquis) presenting with sharp crushing nonradiating substernal chest pain that began around 10:30AM this morning 4/18 while sitting at his desk working. He tried laying down and repositioning with no relief. States the pain was associated with nausea and diaphoresis. Pain did not radiate and patient denies episodes of vomiting. Patient states that he has never experienced pain like this before and prompted him to present to the hospital. In the ER, EKG showed VEDA in inferior leads with STD in precordial leads and upright T waves concerning for posterior wall STEMI. HR was felecia to 50s but hemodynamically stable. Pt was loaded with ASA 324mg, Brilinta 180mg, and Heparin 4000u and was taken emergently to the cath lab.  (18 Apr 2023 12:47)      24hr Interval History: RRA band removed 5PM 4/18 without complications.       Physical Exam:  Vital Signs Last 24 Hrs  T(C): 36.9 (19 Apr 2023 05:00), Max: 36.9 (19 Apr 2023 05:00)  T(F): 98.4 (19 Apr 2023 05:00), Max: 98.4 (19 Apr 2023 05:00)  HR: 54 (19 Apr 2023 06:00) (49 - 79)  BP: 108/67 (19 Apr 2023 06:00) (100/59 - 164/112)  BP(mean): 81 (19 Apr 2023 06:00) (76 - 108)  RR: 18 (19 Apr 2023 06:00) (8 - 32)  SpO2: 93% (19 Apr 2023 06:00) (92% - 100%)    Parameters below as of 19 Apr 2023 06:00  Patient On (Oxygen Delivery Method): room air        PHYSICAL EXAM:  Constitutional: Patient laying in bed, NAD  Head: Atraumatic,  Eyes: No scleral icterus.   ENMT: Moist mucous membrane.   Neck: Supple  Respiratory: CTA B/L. No wheezes, rales or rhonchi   Cardiovascular: S1/S2. No murmurs, rubs, or gallops.  Gastrointestinal: Nondistended, BSx4, soft, nontender.  Extremities: Moves all extremities. Warm, no edema, nontender  Vascular: 2+ DP/PT pulses B/L. RRA site soft, without ecchymosis.  Neurological: A&Ox3. Follows commands. No focal deficits.   Skin: No rashes on exposed skin     ============================I/O===========================   I&O's Detail    18 Apr 2023 07:01  -  19 Apr 2023 06:48  --------------------------------------------------------  IN:    Oral Fluid: 240 mL  Total IN: 240 mL    OUT:    Voided (mL): 1530 mL  Total OUT: 1530 mL    Total NET: -1290 mL        ============================ LABS =========================                        14.1   9.25  )-----------( 142      ( 19 Apr 2023 01:03 )             41.9     04-19    134<L>  |  101  |  16  ----------------------------<  185<H>  4.0   |  23  |  1.08    Ca    7.8<L>      19 Apr 2023 01:03  Phos  2.6     04-19  Mg     5.2     04-19    TPro  5.6<L>  /  Alb  3.3  /  TBili  0.5  /  DBili  x   /  AST  144<H>  /  ALT  30  /  AlkPhos  80  04-19    LIVER FUNCTIONS - ( 19 Apr 2023 01:03 )  Alb: 3.3 g/dL / Pro: 5.6 g/dL / ALK PHOS: 80 U/L / ALT: 30 U/L / AST: 144 U/L / GGT: x           PT/INR - ( 19 Apr 2023 01:03 )   PT: 12.6 sec;   INR: 1.09 ratio         PTT - ( 19 Apr 2023 01:03 )  PTT:26.6 sec      ======================Micro/Rad/Cardio=================  Culture: Reviewed   CXR: Reviewed  Echo:Reviewed  ======================================================  PAST MEDICAL & SURGICAL HISTORY:  PE (pulmonary thromboembolism)  2019 on Eliquis      COVID-19 virus infection  12/2021 with mild symptoms      Cyst, baker's knee, right      H/O bilateral hip replacements  Left 2012, right 12/2021      S/P lumbar laminectomy  2010        ====================ASSESSMENT ==============  63M w/ PMHx of PE (on Eliquis) presenting with acute chest pain, EKG in ED concerning for posterior STEMI, admitted to CICU s/p emergent cath with mRCA 100% s/p JEWEL x1 + IC integrilin.     Plan:  ====================== NEUROLOGY=====================  A&Ox3  - no acute issues  - plan for OOB today and ambulation as tolerated  - continue to monitor mental status as per protocol     ==================== RESPIRATORY======================  Hx of PE  - on home eliquis  - follows with Dr. Haro (Heme/Onc)  - Loaded on Brilinta prior to cath, reloaded on Plavix 4/19 AM to continue eliquis  - will be on triple therapy while in hospital, will d/c on plavix and eliquis    On room air  - satting well- continue to monitor SpO2 with goal >94%    ====================CARDIOVASCULAR==================  Posterior STEMI   - 4/18 RRA PCI: mRCA 100% s/p JEWEL x1 + IC integrilin.   - EF 55%, EDP 33  - Loaded with ASA 325mg and Brilinta 180mg in the ER   - given pt on eliquis, will reload with Plavix in AM for appropriate therapy  - trend CE  - TTE: pending this AM 4/19  - will add BB when able, pt HR low 60s while awake     ===================HEMATOLOGIC/ONC ===================  Monitor H/H and plts  - DVT PPX: Home eliquis    ===================== RENAL =========================  BUN and Cr WNL  - Continue monitoring urine output, lytes, SCr/ BUN  - replete lytes prn with goal K >4 and Mg >2    ==================== GASTROINTESTINAL===================  DASH diet    =======================    ENDOCRINE  =====================  A1c 5.8  Follow up TSH  Lipid panel - Chol 208, Triglyc 120, HDL 40,     ========================INFECTIOUS DISEASE================  Afebrile, no leukocytosis  - monitor and trend WBC and temperature curve         Patient requires continuous monitoring with bedside rhythm monitoring, arterial line, pulse oximetry, ventilator monitoring and intermittent blood gas analysis.  Care plan discussed with ICU care team.  Patient remained critical; required more than usual post op care; I have spent 35 minutes providing non-routine post op care, in addition to initial critical time provided by CICU attending Dr. Justice, re-evaluated multiple times during the day.

## 2023-04-19 NOTE — DISCHARGE NOTE NURSING/CASE MANAGEMENT/SOCIAL WORK - NSDCPEFALRISK_GEN_ALL_CORE
For information on Fall & Injury Prevention, visit: [email or web address] OR  [email or web address] OR  https://www.cdc.gov/steadi/patient.html

## 2023-04-19 NOTE — DISCHARGE NOTE PROVIDER - NSDCMRMEDTOKEN_GEN_ALL_CORE_FT
Eliquis 5 mg oral tablet: 1 tab(s) orally every 12 hours   begin 2/23/19   hydrocodone-acetaminophen 5 mg-325 mg oral tablet: 1 tab(s) orally every 6 hours, As Needed -for moderate pain - for severe pain MDD:4 tabs    atorvastatin 80 mg oral tablet: 1 tab(s) orally once a day (at bedtime)  clopidogrel 75 mg oral tablet: 1 tab(s) orally once a day  Eliquis 5 mg oral tablet: 1 tab(s) orally every 12 hours   begin 2/23/19   metoprolol: 12.5 milligram(s) orally 2 times a day   Eliquis 2.5 mg oral tablet: 1 orally 2 times a day

## 2023-04-19 NOTE — DISCHARGE NOTE PROVIDER - CARE PROVIDER_API CALL
Chaka Bucio ()  Cardiovascular Disease; Internal Medicine; Nuclear Cardiology  [address] [place] [address]  Phone: [phone]  Fax: [phone]  Follow Up Time:     Marcial Stafford)  Cardiology; Internal Medicine; Interventional Cardiology  [address]  [place], [place] [id number]  Phone: [phone]  Fax: [phone]  Follow Up Time:

## 2023-04-19 NOTE — PROGRESS NOTE ADULT - CRITICAL CARE ATTENDING COMMENT
STEMI s/p PCI with JEWEL to RCA  - educated on the importance of DAPT   - Ace-inhibitor not initiated  - Beta-blocker will be initiated  - patient is on Lipitor 80 mg daily  - trend cardiac enzymes  - TTE prior to discharge    Patient is critically ill, requiring critical care services. Despite the current condition, the patient is at a high risk of clinical and hemodynamic demise

## 2023-04-19 NOTE — CONSULT NOTE ADULT - ATTENDING COMMENTS
History of unprovoked VTE, on extended therapy with Apixa 2.5mg BID  Continue this, likely life long  Antiplatelet therapy per CCU given his recent PCI    Rupinder

## 2023-04-19 NOTE — DISCHARGE NOTE PROVIDER - HOSPITAL COURSE
Patient is a 63 year old male with PMHx of an unprovoked PE (about 4/5 years ago, on home eliquis) who initially presented with sharp crushing non-radiating substernal chest pain that began around 10:30AM on the morning of 4/18 while sitting at his desk working. He tried laying down and repositioning with no relief. States the pain was associated with nausea and diaphoresis. Pain did not radiate and patient denies episodes of vomiting. Patient states that he has never experienced pain like this before and prompted him to present to the hospital. In the ER, EKG showed VEDA in inferior leads with STD in precordial leads and upright T waves concerning for posterior wall STEMI. HR was felecia to 50s but hemodynamically stable. Pt was loaded with ASA 324mg, Brilinta 180mg, and Heparin 4000u and was taken emergently to the cath lab. He is now s/p cath with mRCA 100% w/ JEWEL x1 + IC integrilin. While recovering in the CICU, he was bradycardic to 50s, which is now resolved. Patient transitioned to Plavix given need for triple therapy. ASA will be discontinued upon discharge from the hospital, as discussed with interventional cardiology. He will f/u as an out-patient with vascular cardiology for his history of unprovoked PE. He tolerated low dose beta-blocker.     TTE 4/19/23 CONCLUSIONS:   1. Normal left ventricular cavity size. The left ventricular wall thickness is normal. The left ventricular systolic function is mildly decreased with an ejection fraction of 62 % by Gan's method of disks. There are regional wall motion abnormalities.   2. Basal and mid inferior wall is abnormal.   3. There is normal left ventricular diastolic function.   4. Mildly enlarged right ventricular cavity size, normal wall thickness and normal systolic function.   5. No pericardial effusion seen.   6. No prior echocardiogram is available for comparison.

## 2023-04-19 NOTE — DISCHARGE NOTE PROVIDER - NSDCFUADDINST_GEN_ALL_CORE_FT
Continue your medications. Do not stop Eliquis or Plavix unless instructed by your cardiologist.  No heavy lifting or pushing/pulling or strenuous activity with procedure arm for 2 weeks. No driving for 2 days. No sex for 1 week.  You may shower 24 hours following procedure but no soaking of your wrist in water (such as in a pool, sink, or tub) for 1 week. Check wrist site for bleeding and/or swelling daily following procedure. Call your doctor/cardiologist immediately for bleeding or swelling or if you have increased/persistent pain or drainage at the wrist site or if you have numbness, tingling or blue or white coloring of your hand or fingers.  Follow up with your cardiologist in 1- 2 weeks. You may call [facility] at [phone] or [phone] after office hours and weekends with any questions or concerns following your procedure.

## 2023-04-19 NOTE — DISCHARGE NOTE PROVIDER - NSDCCPCAREPLAN_GEN_ALL_CORE_FT
PRINCIPAL DISCHARGE DIAGNOSIS  Diagnosis: STEMI (ST elevation myocardial infarction)  Assessment and Plan of Treatment: Low salt, low fat diet.   Weight management.   Take medications as prescribed.    No smoking.  Follow up appointments with your doctor(s)  as instructed.

## 2023-04-28 ENCOUNTER — APPOINTMENT (OUTPATIENT)
Dept: CARDIOLOGY | Facility: CLINIC | Age: 63
End: 2023-04-28
Payer: COMMERCIAL

## 2023-04-28 ENCOUNTER — NON-APPOINTMENT (OUTPATIENT)
Age: 63
End: 2023-04-28

## 2023-04-28 VITALS
BODY MASS INDEX: 31.81 KG/M2 | SYSTOLIC BLOOD PRESSURE: 105 MMHG | HEART RATE: 58 BPM | OXYGEN SATURATION: 97 % | WEIGHT: 240 LBS | HEIGHT: 73 IN | DIASTOLIC BLOOD PRESSURE: 73 MMHG

## 2023-04-28 DIAGNOSIS — I21.11 ST ELEVATION (STEMI) MYOCARDIAL INFARCTION INVOLVING RIGHT CORONARY ARTERY: ICD-10-CM

## 2023-04-28 PROCEDURE — 99215 OFFICE O/P EST HI 40 MIN: CPT

## 2023-04-28 PROCEDURE — 93000 ELECTROCARDIOGRAM COMPLETE: CPT

## 2023-04-28 NOTE — HISTORY OF PRESENT ILLNESS
[FreeTextEntry1] : 63 year old male with PMHx of an unprovoked PE (about 4/5 years ago, on home eliquis) who initially presented with recent STEMI hospitlization. Had sharp crushing non-radiating substernal chest pain that began around 10:30AM on the morning of 4/18 while sitting at his desk working. He tried laying down and repositioning with no \par relief. States the pain was associated with nausea and diaphoresis. Pain did not radiate and patient denies episodes of vomiting. Patient states that he has never experienced pain like this before and prompted him to present to the hospital.\par \par  In the ER, EKG showed VEDA in inferior leads with STD in precordial leads and upright T waves concerning for posterior wall STEMI. HR was felecia to 50s but hemodynamically stable. Pt was loaded with ASA 324mg, Brilinta 180mg, and Heparin 4000u and was taken emergently to the cath lab. He is now s/p cath with mRCA 100% w/ JEWEL x1 + IC integrilin. While recovering in the CICU, he was bradycardic to 50s, which is now resolved. Patient transitioned to Plavix given need for triple therapy. ASA will be discontinued upon discharge from the [facility], as discussed with interventional cardiology. He will f/u as an out-patient with vascular cardiology for his history of unprovoked PE. He tolerated low dose beta-blocker.\par \par A1c 5.8\par \par \par Med Reconciliation: \par Override IMPROVE-DD recommendations due to:	Dual antiplatelet therapy \par Recommended Post-Discharge VTE Prophylaxis	Rivaroxaban 10 mg oral tablet: 1 tab \par orally once a day for 30 days \par Medication Reconciliation Status	Admission Reconciliation is Completed \par Discharge Reconciliation is Not Complete \par Eliquis 2.5 mg oral tablet: 1 orally 2 times a day

## 2023-04-28 NOTE — CARDIOLOGY SUMMARY
[de-identified] : \par TTE 4/19/23 CONCLUSIONS: \par  1. Normal left ventricular cavity size. The left ventricular wall thickness is \par normal. The left ventricular systolic function is mildly decreased with an \par ejection fraction of 62 % by Gan's method of disks. There are regional wall \par motion abnormalities. \par  2. Basal and mid inferior wall is abnormal. \par  3. There is normal left ventricular diastolic function. \par  4. Mildly enlarged right ventricular cavity size, normal wall thickness and \par normal systolic function. \par  5. No pericardial effusion seen. \par  6. No prior echocardiogram is available for comparison. \par  [de-identified] : \par Conclusions: \par 1. 100% mid RCA occlusion, with grade 5 thrombus, as culprit for\par patient's presentation\par \par 2. EF 55%, LVEDP 33.  \par 3. Successul JEWEL placement to mid RCA followed by intracoronary\par integrellin and IVUS guided postdilation.\par 4. RUPAL 3 flow and no chest pain at the end of the case  \par Recommendations: \par \par 1. Maintain on DAPT for 12 months \par 2. TTE within 24 hours with close monitoring in CICU  \par 3. Recommend close follow up with cardiology  \par 4. Findings relayed to patient, patient's wife (Kate) and CICU team  \par

## 2023-04-28 NOTE — DISCUSSION/SUMMARY
[EKG obtained to assist in diagnosis and management of assessed problem(s)] : EKG obtained to assist in diagnosis and management of assessed problem(s) [FreeTextEntry1] : 63 year old male with PMHx of an unprovoked PE (about 4/5 years ago, on home eliquis) with recent STEMI of RCA. Hyperlipidemia. \par -continue clopidogrel 75 mg and DOAC (low dose)\par -continue metoprolol 25 mg XL \par -c/w statin 80 mg \par -labs and TTE within 3 months \par -Mediterranean diet \par \par Differential diagnosis and plan of care discussed with patient after the evaluation. \par Counseling on diet, exercise, and medication compliance was done.\par Counseling on smoking and alcohol cessation was offered when appropriate.\par Pain assessed and judicious use of narcotics when appropriate was discussed.\par Importance of fall prevention discussed.\par Advanced care planning was discussed with patient and family.\par

## 2023-05-05 LAB
ANION GAP SERPL CALC-SCNC: 16 MMOL/L
BUN SERPL-MCNC: 21 MG/DL
CALCIUM SERPL-MCNC: 9.3 MG/DL
CHLORIDE SERPL-SCNC: 102 MMOL/L
CHOLEST SERPL-MCNC: 124 MG/DL
CO2 SERPL-SCNC: 22 MMOL/L
CREAT SERPL-MCNC: 1.15 MG/DL
EGFR: 72 ML/MIN/1.73M2
ESTIMATED AVERAGE GLUCOSE: 117 MG/DL
GLUCOSE SERPL-MCNC: 95 MG/DL
HBA1C MFR BLD HPLC: 5.7 %
HDLC SERPL-MCNC: 36 MG/DL
LDLC SERPL CALC-MCNC: 71 MG/DL
NONHDLC SERPL-MCNC: 88 MG/DL
POTASSIUM SERPL-SCNC: 4.8 MMOL/L
SODIUM SERPL-SCNC: 140 MMOL/L
TRIGL SERPL-MCNC: 84 MG/DL

## 2023-05-05 RX ORDER — METOPROLOL TARTRATE 25 MG/1
25 TABLET, FILM COATED ORAL TWICE DAILY
Qty: 90 | Refills: 2 | Status: DISCONTINUED | COMMUNITY
Start: 2023-04-28 | End: 2023-05-05

## 2023-05-26 ENCOUNTER — APPOINTMENT (OUTPATIENT)
Dept: CARDIOLOGY | Facility: CLINIC | Age: 63
End: 2023-05-26

## 2023-05-30 ENCOUNTER — APPOINTMENT (OUTPATIENT)
Dept: CARDIOLOGY | Facility: CLINIC | Age: 63
End: 2023-05-30
Payer: COMMERCIAL

## 2023-05-30 PROCEDURE — 93306 TTE W/DOPPLER COMPLETE: CPT

## 2023-07-21 ENCOUNTER — NON-APPOINTMENT (OUTPATIENT)
Age: 63
End: 2023-07-21

## 2023-07-21 ENCOUNTER — APPOINTMENT (OUTPATIENT)
Dept: CARDIOLOGY | Facility: CLINIC | Age: 63
End: 2023-07-21
Payer: COMMERCIAL

## 2023-07-21 VITALS
SYSTOLIC BLOOD PRESSURE: 103 MMHG | HEART RATE: 57 BPM | OXYGEN SATURATION: 96 % | WEIGHT: 232 LBS | DIASTOLIC BLOOD PRESSURE: 67 MMHG | BODY MASS INDEX: 30.61 KG/M2

## 2023-07-21 PROCEDURE — 99215 OFFICE O/P EST HI 40 MIN: CPT

## 2023-07-21 PROCEDURE — 93000 ELECTROCARDIOGRAM COMPLETE: CPT

## 2023-07-21 NOTE — DISCUSSION/SUMMARY
[FreeTextEntry1] : 63 year old male with PMHx of an unprovoked PE (about 4/5 years ago, on home eliquis) with recent STEMI of RCA. Hyperlipidemia. Lipids reviewed and within normal limits. \par -continue clopidogrel 75 mg and DOAC (low dose)\par -continue metoprolol 25 mg XL \par -c/w statin 80 mg \par -labs and TTE within 3 months \par -Mediterranean diet \par \par Differential diagnosis and plan of care discussed with patient after the evaluation. \par Counseling on diet, exercise, and medication compliance was done.\par Counseling on smoking and alcohol cessation was offered when appropriate.\par Pain assessed and judicious use of narcotics when appropriate was discussed.\par Importance of fall prevention discussed.\par Advanced care planning was discussed with patient and family.\par  [EKG obtained to assist in diagnosis and management of assessed problem(s)] : EKG obtained to assist in diagnosis and management of assessed problem(s)

## 2023-07-21 NOTE — CARDIOLOGY SUMMARY
[de-identified] : \par TTE 4/19/23 CONCLUSIONS: \par  1. Normal left ventricular cavity size. The left ventricular wall thickness is \par normal. The left ventricular systolic function is mildly decreased with an \par ejection fraction of 62 % by Gan's method of disks. There are regional wall \par motion abnormalities. \par  2. Basal and mid inferior wall is abnormal. \par  3. There is normal left ventricular diastolic function. \par  4. Mildly enlarged right ventricular cavity size, normal wall thickness and \par normal systolic function. \par  5. No pericardial effusion seen. \par  6. No prior echocardiogram is available for comparison. \par  [de-identified] : \par Conclusions: \par 1. 100% mid RCA occlusion, with grade 5 thrombus, as culprit for\par patient's presentation\par \par 2. EF 55%, LVEDP 33.  \par 3. Successul JEWEL placement to mid RCA followed by intracoronary\par integrellin and IVUS guided postdilation.\par 4. RUPAL 3 flow and no chest pain at the end of the case  \par Recommendations: \par \par 1. Maintain on DAPT for 12 months \par 2. TTE within 24 hours with close monitoring in CICU  \par 3. Recommend close follow up with cardiology  \par 4. Findings relayed to patient, patient's wife (Kate) and CICU team  \par

## 2023-07-21 NOTE — HISTORY OF PRESENT ILLNESS
[FreeTextEntry1] : 63 year old male with PMHx of an unprovoked PE (about 4/5 years ago, on home eliquis) who initially presented with recent STEMI hospitalization (4/2023) here for follow up. \par \par In 4/2023, EKG showed VEDA in inferior leads with STD in precordial leads and upright T waves concerning for posterior wall STEMI. HR was felecia to 50s but hemodynamically stable. Pt was loaded with ASA 324mg, Brilinta 180mg, and Heparin 4000u and was taken emergently to the cath lab. He is now s/p cath with mRCA 100% w/ JEWEL x1 + IC integrilin. While recovering in the CICU, he was bradycardic to 50s, which is now resolved. Patient transitioned to Plavix given need for triple therapy. ASA will be discontinued upon discharge from the [facility] hospital, as discussed with interventional cardiology. He will f/u as an out-patient with vascular cardiology for his history of unprovoked PE. He tolerated low dose beta-blocker.\par \par A1c 5.8\par --> 71\par Cholesterol 126\par

## 2023-10-20 ENCOUNTER — APPOINTMENT (OUTPATIENT)
Dept: CARDIOLOGY | Facility: CLINIC | Age: 63
End: 2023-10-20
Payer: COMMERCIAL

## 2023-10-20 ENCOUNTER — NON-APPOINTMENT (OUTPATIENT)
Age: 63
End: 2023-10-20

## 2023-10-20 VITALS
OXYGEN SATURATION: 99 % | HEART RATE: 51 BPM | WEIGHT: 232 LBS | DIASTOLIC BLOOD PRESSURE: 74 MMHG | SYSTOLIC BLOOD PRESSURE: 126 MMHG | BODY MASS INDEX: 30.61 KG/M2

## 2023-10-20 PROCEDURE — 93306 TTE W/DOPPLER COMPLETE: CPT

## 2023-10-20 PROCEDURE — 99215 OFFICE O/P EST HI 40 MIN: CPT

## 2023-10-20 PROCEDURE — 93000 ELECTROCARDIOGRAM COMPLETE: CPT

## 2023-10-20 RX ORDER — APIXABAN 2.5 MG/1
2.5 TABLET, FILM COATED ORAL
Qty: 180 | Refills: 2 | Status: ACTIVE | COMMUNITY
Start: 2023-04-28 | End: 1900-01-01

## 2023-10-20 RX ORDER — ATORVASTATIN CALCIUM 80 MG/1
80 TABLET, FILM COATED ORAL
Qty: 90 | Refills: 2 | Status: ACTIVE | COMMUNITY
Start: 2023-04-28 | End: 1900-01-01

## 2023-10-20 RX ORDER — CLOPIDOGREL BISULFATE 75 MG/1
75 TABLET, FILM COATED ORAL DAILY
Qty: 90 | Refills: 2 | Status: ACTIVE | COMMUNITY
Start: 2023-04-28 | End: 1900-01-01

## 2024-03-08 ENCOUNTER — APPOINTMENT (OUTPATIENT)
Dept: CARDIOLOGY | Facility: CLINIC | Age: 64
End: 2024-03-08

## 2024-03-22 ENCOUNTER — APPOINTMENT (OUTPATIENT)
Dept: CARDIOLOGY | Facility: CLINIC | Age: 64
End: 2024-03-22

## 2024-05-31 ENCOUNTER — NON-APPOINTMENT (OUTPATIENT)
Age: 64
End: 2024-05-31

## 2024-05-31 ENCOUNTER — APPOINTMENT (OUTPATIENT)
Dept: CARDIOLOGY | Facility: CLINIC | Age: 64
End: 2024-05-31

## 2024-05-31 VITALS
DIASTOLIC BLOOD PRESSURE: 72 MMHG | WEIGHT: 235 LBS | HEART RATE: 60 BPM | SYSTOLIC BLOOD PRESSURE: 109 MMHG | HEIGHT: 73 IN | BODY MASS INDEX: 31.14 KG/M2 | OXYGEN SATURATION: 99 %

## 2024-05-31 PROCEDURE — 99215 OFFICE O/P EST HI 40 MIN: CPT | Mod: 25

## 2024-05-31 PROCEDURE — 93000 ELECTROCARDIOGRAM COMPLETE: CPT

## 2024-05-31 RX ORDER — METOPROLOL SUCCINATE 25 MG/1
25 TABLET, EXTENDED RELEASE ORAL DAILY
Qty: 90 | Refills: 2 | Status: DISCONTINUED | COMMUNITY
Start: 2023-05-05 | End: 2024-05-31

## 2024-05-31 NOTE — HISTORY OF PRESENT ILLNESS
[FreeTextEntry1] : 63 year old male with PMHx of an unprovoked PE (about 4/5 years ago, on home eliquis) who initially presented with recent STEMI hospitalization (4/2023) here for follow up.   In 4/2023, EKG showed VDEA in inferior leads with STD in precordial leads and upright T waves concerning for posterior wall STEMI. HR was felecia to 50s but hemodynamically stable. Pt was loaded with ASA 324mg, Brilinta 180mg, and Heparin 4000u and was taken emergently to the cath lab. He is now s/p cath with mRCA 100% w/ JEWEL x1 + IC integrilin. While recovering in the CICU, he was bradycardic to 50s, which is now resolved. Patient transitioned to Plavix given need for triple therapy. ASA will be discontinued upon discharge from the  hospital, as discussed with interventional cardiology. He will f/u as an out-patient with vascular cardiology for his history of unprovoked PE. He tolerated low dose beta-blocker.  He is doing well. No chest pain, no SOB.  Quite active pickle ball. Dental work soon.   A1c 5.8 --> 71 Cholesterol 126

## 2024-05-31 NOTE — DISCUSSION/SUMMARY
[FreeTextEntry1] : 63 year old male with PMHx of an unprovoked PE (about 4/5 years ago, on home eliquis) with recent STEMI of RCA. Hyperlipidemia. Lipids reviewed and within normal limits.  -continue clopidogrel 75 mg and DOAC (low dose) -continue metoprolol 25 mg XL  -c/w statin 80 mg  -Mediterranean diet  -to stay on DOAC and P2Y12 inhibitor for 12 months at least, then can consider backing of DOAC for invasive procedure.   Differential diagnosis and plan of care discussed with patient after the evaluation.  Counseling on diet, exercise, and medication compliance was done. Counseling on smoking and alcohol cessation was offered when appropriate. Pain assessed and judicious use of narcotics when appropriate was discussed. Importance of fall prevention discussed. Advanced care planning was discussed with patient and family.

## 2024-05-31 NOTE — CARDIOLOGY SUMMARY
[de-identified] : \par  TTE 4/19/23 CONCLUSIONS: \par   1. Normal left ventricular cavity size. The left ventricular wall thickness is \par  normal. The left ventricular systolic function is mildly decreased with an \par  ejection fraction of 62 % by Gan's method of disks. There are regional wall \par  motion abnormalities. \par   2. Basal and mid inferior wall is abnormal. \par   3. There is normal left ventricular diastolic function. \par   4. Mildly enlarged right ventricular cavity size, normal wall thickness and \par  normal systolic function. \par   5. No pericardial effusion seen. \par   6. No prior echocardiogram is available for comparison. \par   [de-identified] : \par  Conclusions: \par  1. 100% mid RCA occlusion, with grade 5 thrombus, as culprit for\par  patient's presentation\par  \par  2. EF 55%, LVEDP 33.  \par  3. Successul JEWEL placement to mid RCA followed by intracoronary\par  integrellin and IVUS guided postdilation.\par  4. RUPAL 3 flow and no chest pain at the end of the case  \par  Recommendations: \par  \par  1. Maintain on DAPT for 12 months \par  2. TTE within 24 hours with close monitoring in CICU  \par  3. Recommend close follow up with cardiology  \par  4. Findings relayed to patient, patient's wife (Kate) and CICU team  \par

## 2024-07-11 ENCOUNTER — NON-APPOINTMENT (OUTPATIENT)
Age: 64
End: 2024-07-11

## 2024-07-12 ENCOUNTER — APPOINTMENT (OUTPATIENT)
Dept: DERMATOLOGY | Facility: CLINIC | Age: 64
End: 2024-07-12
Payer: COMMERCIAL

## 2024-07-12 DIAGNOSIS — R22.9 LOCALIZED SWELLING, MASS AND LUMP, UNSPECIFIED: ICD-10-CM

## 2024-07-12 PROCEDURE — 99213 OFFICE O/P EST LOW 20 MIN: CPT

## 2024-07-16 ENCOUNTER — EMERGENCY (EMERGENCY)
Facility: HOSPITAL | Age: 64
LOS: 1 days | Discharge: HOME | End: 2024-07-16
Attending: EMERGENCY MEDICINE
Payer: COMMERCIAL

## 2024-07-16 VITALS
SYSTOLIC BLOOD PRESSURE: 118 MMHG | OXYGEN SATURATION: 96 % | DIASTOLIC BLOOD PRESSURE: 75 MMHG | TEMPERATURE: 98 F | RESPIRATION RATE: 18 BRPM | HEART RATE: 57 BPM

## 2024-07-16 VITALS
DIASTOLIC BLOOD PRESSURE: 81 MMHG | OXYGEN SATURATION: 97 % | WEIGHT: 235.01 LBS | RESPIRATION RATE: 16 BRPM | SYSTOLIC BLOOD PRESSURE: 119 MMHG | TEMPERATURE: 98 F | HEART RATE: 71 BPM | HEIGHT: 73 IN

## 2024-07-16 DIAGNOSIS — Z98.890 OTHER SPECIFIED POSTPROCEDURAL STATES: Chronic | ICD-10-CM

## 2024-07-16 DIAGNOSIS — Z96.643 PRESENCE OF ARTIFICIAL HIP JOINT, BILATERAL: Chronic | ICD-10-CM

## 2024-07-16 LAB
ALBUMIN SERPL ELPH-MCNC: 3.6 G/DL — SIGNIFICANT CHANGE UP (ref 3.3–5)
ALP SERPL-CCNC: 109 U/L — SIGNIFICANT CHANGE UP (ref 40–120)
ALT FLD-CCNC: 26 U/L — SIGNIFICANT CHANGE UP (ref 10–45)
ANION GAP SERPL CALC-SCNC: 10 MMOL/L — SIGNIFICANT CHANGE UP (ref 5–17)
APTT BLD: 29.8 SEC — SIGNIFICANT CHANGE UP (ref 24.5–35.6)
AST SERPL-CCNC: 29 U/L — SIGNIFICANT CHANGE UP (ref 10–40)
BASOPHILS # BLD AUTO: 0.04 K/UL — SIGNIFICANT CHANGE UP (ref 0–0.2)
BASOPHILS NFR BLD AUTO: 0.6 % — SIGNIFICANT CHANGE UP (ref 0–2)
BILIRUB SERPL-MCNC: 0.7 MG/DL — SIGNIFICANT CHANGE UP (ref 0.2–1.2)
BUN SERPL-MCNC: 20 MG/DL — SIGNIFICANT CHANGE UP (ref 7–23)
CALCIUM SERPL-MCNC: 9 MG/DL — SIGNIFICANT CHANGE UP (ref 8.4–10.5)
CHLORIDE SERPL-SCNC: 104 MMOL/L — SIGNIFICANT CHANGE UP (ref 96–108)
CO2 SERPL-SCNC: 24 MMOL/L — SIGNIFICANT CHANGE UP (ref 22–31)
CREAT SERPL-MCNC: 1.03 MG/DL — SIGNIFICANT CHANGE UP (ref 0.5–1.3)
EGFR: 82 ML/MIN/1.73M2 — SIGNIFICANT CHANGE UP
EOSINOPHIL # BLD AUTO: 0.14 K/UL — SIGNIFICANT CHANGE UP (ref 0–0.5)
EOSINOPHIL NFR BLD AUTO: 2.2 % — SIGNIFICANT CHANGE UP (ref 0–6)
GLUCOSE SERPL-MCNC: 107 MG/DL — HIGH (ref 70–99)
HCT VFR BLD CALC: 42 % — SIGNIFICANT CHANGE UP (ref 39–50)
HGB BLD-MCNC: 14.3 G/DL — SIGNIFICANT CHANGE UP (ref 13–17)
IMM GRANULOCYTES NFR BLD AUTO: 0.5 % — SIGNIFICANT CHANGE UP (ref 0–0.9)
INR BLD: 1.04 RATIO — SIGNIFICANT CHANGE UP (ref 0.85–1.18)
LYMPHOCYTES # BLD AUTO: 0.98 K/UL — LOW (ref 1–3.3)
LYMPHOCYTES # BLD AUTO: 15.2 % — SIGNIFICANT CHANGE UP (ref 13–44)
MCHC RBC-ENTMCNC: 30.8 PG — SIGNIFICANT CHANGE UP (ref 27–34)
MCHC RBC-ENTMCNC: 34 GM/DL — SIGNIFICANT CHANGE UP (ref 32–36)
MCV RBC AUTO: 90.5 FL — SIGNIFICANT CHANGE UP (ref 80–100)
MONOCYTES # BLD AUTO: 0.71 K/UL — SIGNIFICANT CHANGE UP (ref 0–0.9)
MONOCYTES NFR BLD AUTO: 11 % — SIGNIFICANT CHANGE UP (ref 2–14)
NEUTROPHILS # BLD AUTO: 4.56 K/UL — SIGNIFICANT CHANGE UP (ref 1.8–7.4)
NEUTROPHILS NFR BLD AUTO: 70.5 % — SIGNIFICANT CHANGE UP (ref 43–77)
NRBC # BLD: 0 /100 WBCS — SIGNIFICANT CHANGE UP (ref 0–0)
PLATELET # BLD AUTO: 154 K/UL — SIGNIFICANT CHANGE UP (ref 150–400)
POTASSIUM SERPL-MCNC: 4.3 MMOL/L — SIGNIFICANT CHANGE UP (ref 3.5–5.3)
POTASSIUM SERPL-SCNC: 4.3 MMOL/L — SIGNIFICANT CHANGE UP (ref 3.5–5.3)
PROT SERPL-MCNC: 6.2 G/DL — SIGNIFICANT CHANGE UP (ref 6–8.3)
PROTHROM AB SERPL-ACNC: 11.4 SEC — SIGNIFICANT CHANGE UP (ref 9.5–13)
RBC # BLD: 4.64 M/UL — SIGNIFICANT CHANGE UP (ref 4.2–5.8)
RBC # FLD: 13.1 % — SIGNIFICANT CHANGE UP (ref 10.3–14.5)
SODIUM SERPL-SCNC: 138 MMOL/L — SIGNIFICANT CHANGE UP (ref 135–145)
WBC # BLD: 6.46 K/UL — SIGNIFICANT CHANGE UP (ref 3.8–10.5)
WBC # FLD AUTO: 6.46 K/UL — SIGNIFICANT CHANGE UP (ref 3.8–10.5)

## 2024-07-16 PROCEDURE — 80053 COMPREHEN METABOLIC PANEL: CPT

## 2024-07-16 PROCEDURE — 85610 PROTHROMBIN TIME: CPT

## 2024-07-16 PROCEDURE — 36415 COLL VENOUS BLD VENIPUNCTURE: CPT

## 2024-07-16 PROCEDURE — 70450 CT HEAD/BRAIN W/O DYE: CPT | Mod: 26,MC

## 2024-07-16 PROCEDURE — 85730 THROMBOPLASTIN TIME PARTIAL: CPT

## 2024-07-16 PROCEDURE — 99284 EMERGENCY DEPT VISIT MOD MDM: CPT

## 2024-07-16 PROCEDURE — 70450 CT HEAD/BRAIN W/O DYE: CPT | Mod: MC

## 2024-07-16 PROCEDURE — 96374 THER/PROPH/DIAG INJ IV PUSH: CPT

## 2024-07-16 PROCEDURE — 99284 EMERGENCY DEPT VISIT MOD MDM: CPT | Mod: 25

## 2024-07-16 PROCEDURE — 85025 COMPLETE CBC W/AUTO DIFF WBC: CPT

## 2024-07-16 PROCEDURE — 96375 TX/PRO/DX INJ NEW DRUG ADDON: CPT

## 2024-07-16 RX ORDER — ACETAMINOPHEN 325 MG
1000 TABLET ORAL ONCE
Refills: 0 | Status: COMPLETED | OUTPATIENT
Start: 2024-07-16 | End: 2024-07-16

## 2024-07-16 RX ORDER — METOCLOPRAMIDE 5 MG/5ML
10 SOLUTION ORAL ONCE
Refills: 0 | Status: COMPLETED | OUTPATIENT
Start: 2024-07-16 | End: 2024-07-16

## 2024-07-16 RX ORDER — SODIUM CHLORIDE 0.9 % (FLUSH) 0.9 %
1000 SYRINGE (ML) INJECTION ONCE
Refills: 0 | Status: COMPLETED | OUTPATIENT
Start: 2024-07-16 | End: 2024-07-16

## 2024-07-16 RX ADMIN — METOCLOPRAMIDE 10 MILLIGRAM(S): 5 SOLUTION ORAL at 06:26

## 2024-07-16 RX ADMIN — Medication 400 MILLIGRAM(S): at 06:26

## 2024-07-16 RX ADMIN — Medication 1000 MILLILITER(S): at 06:26

## 2024-08-06 ENCOUNTER — RX RENEWAL (OUTPATIENT)
Age: 64
End: 2024-08-06

## 2024-08-09 ENCOUNTER — APPOINTMENT (OUTPATIENT)
Dept: ORTHOPEDIC SURGERY | Facility: CLINIC | Age: 64
End: 2024-08-09

## 2024-08-30 ENCOUNTER — APPOINTMENT (OUTPATIENT)
Dept: ORTHOPEDIC SURGERY | Facility: CLINIC | Age: 64
End: 2024-08-30
Payer: COMMERCIAL

## 2024-08-30 ENCOUNTER — NON-APPOINTMENT (OUTPATIENT)
Age: 64
End: 2024-08-30

## 2024-08-30 VITALS — HEIGHT: 73 IN | BODY MASS INDEX: 30.48 KG/M2 | WEIGHT: 230 LBS

## 2024-08-30 DIAGNOSIS — M71.21 SYNOVIAL CYST OF POPLITEAL SPACE [BAKER], RIGHT KNEE: ICD-10-CM

## 2024-08-30 DIAGNOSIS — M17.0 BILATERAL PRIMARY OSTEOARTHRITIS OF KNEE: ICD-10-CM

## 2024-08-30 PROCEDURE — 73564 X-RAY EXAM KNEE 4 OR MORE: CPT | Mod: 50

## 2024-08-30 PROCEDURE — 99204 OFFICE O/P NEW MOD 45 MIN: CPT

## 2024-11-29 ENCOUNTER — APPOINTMENT (OUTPATIENT)
Dept: CARDIOLOGY | Facility: CLINIC | Age: 64
End: 2024-11-29

## 2025-02-07 NOTE — ED ADULT NURSE NOTE - PERIPHERAL VASCULAR
Mariano Mendieta is requesting a refill of amphetamine-dextroamphetamine (ADDERALL) 20 MG tablet  for a 30 day supply and would like   [facility]/pharmacy #[id number] [place], [place] - [address] [facility]  [address] [place] [id number]  Phone: [phone] Fax: [phone] .     Ok to leave a detailed message on voicemail Yes     - - -

## 2025-03-25 ENCOUNTER — APPOINTMENT (OUTPATIENT)
Dept: CARDIOLOGY | Facility: CLINIC | Age: 65
End: 2025-03-25
Payer: COMMERCIAL

## 2025-03-25 ENCOUNTER — NON-APPOINTMENT (OUTPATIENT)
Age: 65
End: 2025-03-25

## 2025-03-25 VITALS
WEIGHT: 240 LBS | DIASTOLIC BLOOD PRESSURE: 71 MMHG | SYSTOLIC BLOOD PRESSURE: 108 MMHG | TEMPERATURE: 97.9 F | BODY MASS INDEX: 31.81 KG/M2 | OXYGEN SATURATION: 98 % | HEART RATE: 79 BPM | HEIGHT: 73 IN

## 2025-03-25 PROCEDURE — 99215 OFFICE O/P EST HI 40 MIN: CPT

## 2025-03-25 PROCEDURE — 93000 ELECTROCARDIOGRAM COMPLETE: CPT

## 2025-03-25 PROCEDURE — G2211 COMPLEX E/M VISIT ADD ON: CPT | Mod: NC

## 2025-03-26 LAB
ALBUMIN SERPL ELPH-MCNC: 4 G/DL
ALP BLD-CCNC: 123 U/L
ALT SERPL-CCNC: 26 U/L
ANION GAP SERPL CALC-SCNC: 10 MMOL/L
AST SERPL-CCNC: 28 U/L
BILIRUB SERPL-MCNC: 0.5 MG/DL
BUN SERPL-MCNC: 18 MG/DL
CALCIUM SERPL-MCNC: 9 MG/DL
CHLORIDE SERPL-SCNC: 105 MMOL/L
CHOLEST SERPL-MCNC: 135 MG/DL
CO2 SERPL-SCNC: 27 MMOL/L
CREAT SERPL-MCNC: 1.11 MG/DL
EGFRCR SERPLBLD CKD-EPI 2021: 75 ML/MIN/1.73M2
GLUCOSE SERPL-MCNC: 80 MG/DL
HDLC SERPL-MCNC: 39 MG/DL
LDLC SERPL-MCNC: 75 MG/DL
NONHDLC SERPL-MCNC: 96 MG/DL
POTASSIUM SERPL-SCNC: 4.7 MMOL/L
PROT SERPL-MCNC: 6.3 G/DL
SODIUM SERPL-SCNC: 142 MMOL/L
TRIGL SERPL-MCNC: 114 MG/DL

## 2025-03-27 LAB
ESTIMATED AVERAGE GLUCOSE: 120 MG/DL
HBA1C MFR BLD HPLC: 5.8 %
HCT VFR BLD CALC: 45.5 %
HGB BLD-MCNC: 15.2 G/DL
MCHC RBC-ENTMCNC: 30 PG
MCHC RBC-ENTMCNC: 33.4 G/DL
MCV RBC AUTO: 89.9 FL
PLATELET # BLD AUTO: 190 K/UL
RBC # BLD: 5.06 M/UL
RBC # FLD: 13.4 %
WBC # FLD AUTO: 6.94 K/UL

## 2025-04-04 ENCOUNTER — APPOINTMENT (OUTPATIENT)
Dept: CARDIOLOGY | Facility: CLINIC | Age: 65
End: 2025-04-04

## 2025-05-16 ENCOUNTER — RX RENEWAL (OUTPATIENT)
Age: 65
End: 2025-05-16

## 2025-09-05 ENCOUNTER — APPOINTMENT (OUTPATIENT)
Dept: CARDIOLOGY | Facility: CLINIC | Age: 65
End: 2025-09-05

## 2025-09-09 ENCOUNTER — APPOINTMENT (OUTPATIENT)
Dept: PODIATRY | Facility: CLINIC | Age: 65
End: 2025-09-09

## 2025-09-09 DIAGNOSIS — M20.40 OTHER HAMMER TOE(S) (ACQUIRED), UNSPECIFIED FOOT: ICD-10-CM

## 2025-09-09 DIAGNOSIS — M79.676 PAIN IN UNSPECIFIED TOE(S): ICD-10-CM

## 2025-09-09 DIAGNOSIS — B35.3 TINEA PEDIS: ICD-10-CM

## 2025-09-09 PROCEDURE — 99203 OFFICE O/P NEW LOW 30 MIN: CPT

## 2025-09-09 RX ORDER — KETOCONAZOLE 20 MG/G
2 CREAM TOPICAL
Qty: 60 | Refills: 2 | Status: ACTIVE | COMMUNITY
Start: 2025-09-09 | End: 1900-01-01

## 2025-09-11 PROBLEM — M79.676 TOE PAIN: Status: ACTIVE | Noted: 2025-09-11

## 2025-09-11 PROBLEM — B35.3 TINEA PEDIS OF LEFT FOOT: Status: ACTIVE | Noted: 2025-09-11

## 2025-09-11 PROBLEM — M20.40 HAMMERTOE: Status: ACTIVE | Noted: 2025-09-11

## (undated) DEVICE — SOL IRR POUR NS 0.9% 500ML

## (undated) DEVICE — SUT POLYSORB 2-0 18" TIES UNDYED

## (undated) DEVICE — DRAPE TOWEL BLUE 17" X 24"

## (undated) DEVICE — MARKING PEN W RULER

## (undated) DEVICE — DRAPE INSTRUMENT POUCH 6.75" X 11"

## (undated) DEVICE — DRSG TEGADERM + PAD 3.5X4"

## (undated) DEVICE — SUT POLYSORB 3-0 30" V-20 UNDYED

## (undated) DEVICE — PREP CHLORAPREP HI-LITE ORANGE 26ML

## (undated) DEVICE — SUT MONOCRYL 4-0 27" PS-2 UNDYED

## (undated) DEVICE — DRSG CURITY GAUZE SPONGE 4 X 4" 12-PLY

## (undated) DEVICE — VENODYNE/SCD SLEEVE CALF MEDIUM

## (undated) DEVICE — LAP PAD 18 X 18"

## (undated) DEVICE — SUT POLYSORB 3-0 18" TIES UNDYED

## (undated) DEVICE — GLV 8.5 PROTEXIS (WHITE)

## (undated) DEVICE — DRSG TELFA 3 X 8

## (undated) DEVICE — SUT SURGIPRO 2-0 30" GS-22

## (undated) DEVICE — GOWN TRIMAX LG

## (undated) DEVICE — SPECIMEN CONTAINER 100ML

## (undated) DEVICE — SUT SURGIPRO 0 30" GS-22

## (undated) DEVICE — POSITIONER PATIENT SAFETY STRAP 3X60"

## (undated) DEVICE — BLADE SCALPEL SAFETYLOCK #15

## (undated) DEVICE — DRSG STERISTRIPS 0.5 X 4"

## (undated) DEVICE — WARMING BLANKET UPPER ADULT

## (undated) DEVICE — VISITEC 4X4

## (undated) DEVICE — DRAIN PENROSE .5" X 18" LATEX

## (undated) DEVICE — PACK ADULT HERNIA

## (undated) DEVICE — MEDICATION LABELS W MARKER

## (undated) DEVICE — SOL IRR POUR H2O 250ML

## (undated) DEVICE — POSITIONER FOAM EGG CRATE ULNAR 2PCS (PINK)

## (undated) DEVICE — SUT POLYSORB 2-0 30" V-20 UNDYED

## (undated) DEVICE — LIGASURE SMALL JAW